# Patient Record
Sex: FEMALE | Race: WHITE | NOT HISPANIC OR LATINO | Employment: FULL TIME | URBAN - METROPOLITAN AREA
[De-identification: names, ages, dates, MRNs, and addresses within clinical notes are randomized per-mention and may not be internally consistent; named-entity substitution may affect disease eponyms.]

---

## 2019-04-16 ENCOUNTER — TRANSCRIBE ORDERS (OUTPATIENT)
Dept: ADMINISTRATIVE | Facility: HOSPITAL | Age: 59
End: 2019-04-16

## 2019-04-16 DIAGNOSIS — Z12.39 SCREENING BREAST EXAMINATION: Primary | ICD-10-CM

## 2019-05-09 ENCOUNTER — HOSPITAL ENCOUNTER (OUTPATIENT)
Dept: MAMMOGRAPHY | Facility: HOSPITAL | Age: 59
Discharge: HOME | End: 2019-05-09
Admitting: OBSTETRICS & GYNECOLOGY

## 2019-05-09 DIAGNOSIS — Z12.39 SCREENING BREAST EXAMINATION: ICD-10-CM

## 2019-05-09 PROCEDURE — 77067 SCR MAMMO BI INCL CAD: CPT

## 2019-05-09 PROCEDURE — 77063 BREAST TOMOSYNTHESIS BI: CPT

## 2019-05-13 ENCOUNTER — TELEPHONE (OUTPATIENT)
Dept: OBSTETRICS AND GYNECOLOGY | Age: 59
End: 2019-05-13

## 2019-05-13 DIAGNOSIS — R92.8 ABNORMAL MAMMOGRAM OF RIGHT BREAST: Primary | ICD-10-CM

## 2019-05-13 NOTE — TELEPHONE ENCOUNTER
Called to notify of need for breast ultrasound due to abnormality in the right breast, likely cyst but warrants further imaging. Order placed for breast US  I have not seen this pt but has an upcoming appt  Unable to reach, left VM for her to call back    Eneida Dubois MD  5/13/2019  1:38 PM

## 2019-05-21 ENCOUNTER — HOSPITAL ENCOUNTER (OUTPATIENT)
Dept: ULTRASOUND IMAGING | Facility: HOSPITAL | Age: 59
Discharge: HOME | End: 2019-05-21
Admitting: OBSTETRICS & GYNECOLOGY

## 2019-05-21 DIAGNOSIS — R92.8 ABNORMAL MAMMOGRAM OF RIGHT BREAST: ICD-10-CM

## 2019-05-21 PROCEDURE — 76641 ULTRASOUND BREAST COMPLETE: CPT

## 2019-05-21 PROCEDURE — 76642 ULTRASOUND BREAST LIMITED: CPT

## 2019-05-22 ENCOUNTER — TELEPHONE (OUTPATIENT)
Dept: OBSTETRICS AND GYNECOLOGY | Age: 59
End: 2019-05-22

## 2019-05-22 NOTE — TELEPHONE ENCOUNTER
----- Message from Eneida Dubois MD sent at 5/21/2019  5:28 PM EDT -----  Please confirm that the patient knows that the ultrasound showed an area that is stable since 2014 and appears to be benign. Recommend to repeat mammogram next year.    ----- Message -----  From: Interface, Rad Results Atempo In  Sent: 5/21/2019   5:14 PM  To: Eneida Dubois MD

## 2019-06-28 ENCOUNTER — OFFICE VISIT (OUTPATIENT)
Dept: OBSTETRICS AND GYNECOLOGY | Age: 59
End: 2019-06-28

## 2019-06-28 VITALS
BODY MASS INDEX: 29.33 KG/M2 | DIASTOLIC BLOOD PRESSURE: 70 MMHG | HEIGHT: 69 IN | WEIGHT: 198 LBS | SYSTOLIC BLOOD PRESSURE: 120 MMHG

## 2019-06-28 DIAGNOSIS — L98.9 SKIN LESION: ICD-10-CM

## 2019-06-28 DIAGNOSIS — Z00.00 HEALTH MAINTENANCE EXAMINATION: Primary | ICD-10-CM

## 2019-06-28 DIAGNOSIS — Z12.4 SCREENING FOR MALIGNANT NEOPLASM OF CERVIX: ICD-10-CM

## 2019-06-28 PROCEDURE — 11104 PUNCH BX SKIN SINGLE LESION: CPT | Performed by: OBSTETRICS & GYNECOLOGY

## 2019-06-28 PROCEDURE — 99386 PREV VISIT NEW AGE 40-64: CPT | Performed by: OBSTETRICS & GYNECOLOGY

## 2019-06-28 NOTE — PROGRESS NOTES
Skin lesion biopsy    She had a flat, stuck on pigmented lesion of the left inner thigh.  She had recently noticed it and was concerned.  Offered biopsy of it and she agreed.  I discussed risk of infection, bleeding, discomfort.  Informed consent was obtained    The skin was cleansed with as per alcohol. The subcutaneous tissue was injected with 3 mL's of 1% lidocaine with epinephrine.  A 5 mm punch biopsy was used and the underlying tissue was incised sharply.  5-0 Monocryl was used to reapproximate the skin. Triple antibiotic ointment was placed over top.    She tolerated the procedure well    Eneida Dubois MD  6/28/2019  3:55 PM

## 2019-06-28 NOTE — PROGRESS NOTES
Routine Annual Visit    2019    Patient: Cris Martin          MR#:[id number]      Chief Complaint   Patient presents with   • Gynecologic Exam     New pt. last pap 6 yrs ago per pt. last MG 2019       History of Present Illness    59 y.o. female  who presents for annual exam. It has been about six years since last exam, was  five years ago.  Menopause age 50 approx  No hot flashes  No urinary incontinence  No sexual activity, is divorved  Works for the postal service, delivers mail    Health Maintenance  Gardasil no  Last pap 6 yrs ago  Mammogram  This yr  Colonoscopy , no polyps, 10 yr  PCP no PCP    No LMP recorded. Patient is postmenopausal.  Obstetric History:  OB History      Para Term  AB Living    4 2 2   2 2    SAB TAB Ectopic Molar Multiple Live Births                        Menstrual History:     No LMP recorded. Patient is postmenopausal.       Sexual History:    not active    ________________________________________  There is no problem list on file for this patient.      Past Medical History:   Diagnosis Date   • Basal cell carcinoma        Family History   Problem Relation Age of Onset   • Breast cancer Maternal Grandmother    • Breast cancer Maternal Aunt    • Heart attack Maternal Aunt    • Heart attack Maternal Grandfather        Past Surgical History:   Procedure Laterality Date   • TUBAL ABDOMINAL LIGATION     • WISDOM TOOTH EXTRACTION     • WRIST SURGERY         Social History     Tobacco Use   Smoking Status Former Smoker   Smokeless Tobacco Never Used       currently has no medications in their medication list.  ________________________________________    Current contraception: post menopausal status  History of abnormal Pap smear: no  Family history of Breast, ovarian, uterine, colon, pancreatic cancer: yes - see above her pain is  History of abnormal mammogram: benign stable finding on most recent 10/18    The following portions of the patient's history  "were reviewed and updated as appropriate: allergies, current medications, past family history, past medical history, past social history, past surgical history and problem list.    Review of Systems a comprehensive review of systems is negative except as noted above    Objective   Physical Exam    /70   Ht 174 cm (68.5\")   Wt 89.8 kg (198 lb)   Breastfeeding? No   BMI 29.67 kg/m²    BP Readings from Last 3 Encounters:   06/28/19 120/70      Wt Readings from Last 3 Encounters:   06/28/19 89.8 kg (198 lb)         BMI: Body mass index is 29.67 kg/m².       General:   alert, appears stated age and cooperative   Heart:: regular rate and rhythm, S1, S2 normal, no murmur, click, rub or gallop   Lungs: normal respiratory effort and auscultation   Abdomen: soft, non-tender, without masses or organomegaly   Breast: inspection negative, no nipple discharge or bleeding, no masses or nodularity palpable   Urethra and bladder: urethral meatus normal; bladder nontender to palpation;   Vulva: normal, Bartholin's, Urethra, [place]'s normal   Vagina: normal mucosa, normal discharge   Cervix: multiparous appearance and no lesions   Uterus: normal size or anteverted   Adnexa: normal adnexa and no mass, fullness, tenderness   thigh Left thigh with pigment, stuck on lesions likely seborrheic keratosis but biopsy performed       Assessment:    abnormal annual exam w skin lesions    Plan:    Plan     []  Mammogram request made- already done within yr  [x]  PAP done  [x]  Labs: health maintenance labs, has no pcp, encouraged her to get one  []  GC/Chl/TV  []  DEXA scan   []  Referral for colonoscopy:     Problems Addressed this Visit     None      Visit Diagnoses     Health maintenance examination    -  Primary    Relevant Orders    TSH    Lipid Panel    Hemoglobin A1c    CBC & Differential    Basic Metabolic Panel    Skin lesion        Relevant Orders    Reference Histopathology    Screening for malignant neoplasm of cervix        " Relevant Orders    IGP, Apt HPV,rfx 16 / 18,45          Counseling  [x]  Nutrition  [x]  Physical activity/regular exercise   [x]  Healthy weight  []  Injury prevention  []  Smoking cessation  []  Substance misuse/abuse  [x]  Sexual behavior  []  STD prevention  []  Contraception  []  Dental health  []  Mental health  []  Immunization  [x]  Encouraged QUYNH Dubois MD  06/28/2019  11:36 AM

## 2019-07-02 LAB
BASOPHILS # BLD AUTO: 0.03 10*3/MM3 (ref 0–0.2)
BASOPHILS NFR BLD AUTO: 0.6 % (ref 0–1.5)
BUN SERPL-MCNC: 13 MG/DL (ref 8–23)
BUN/CREAT SERPL: 16.9 (ref 7–25)
CALCIUM SERPL-MCNC: 9 MG/DL (ref 8.6–10.5)
CHLORIDE SERPL-SCNC: 106 MMOL/L (ref 98–107)
CHOLEST SERPL-MCNC: 234 MG/DL (ref 0–200)
CO2 SERPL-SCNC: 28.3 MMOL/L (ref 22–29)
CREAT SERPL-MCNC: 0.77 MG/DL (ref 0.57–1)
CYTOLOGIST CVX/VAG CYTO: NORMAL
CYTOLOGY CVX/VAG DOC CYTO: NORMAL
CYTOLOGY CVX/VAG DOC THIN PREP: NORMAL
DX ICD CODE: NORMAL
EOSINOPHIL # BLD AUTO: 0.11 10*3/MM3 (ref 0–0.4)
EOSINOPHIL NFR BLD AUTO: 2.1 % (ref 0.3–6.2)
ERYTHROCYTE [DISTWIDTH] IN BLOOD BY AUTOMATED COUNT: 14 % (ref 12.3–15.4)
GLUCOSE SERPL-MCNC: 94 MG/DL (ref 65–99)
HBA1C MFR BLD: 5.9 % (ref 4.8–5.6)
HCT VFR BLD AUTO: 41.2 % (ref 34–46.6)
HDLC SERPL-MCNC: 97 MG/DL (ref 40–60)
HGB BLD-MCNC: 12.4 G/DL (ref 12–15.9)
HIV 1 & 2 AB SER-IMP: NORMAL
HPV I/H RISK 4 DNA CVX QL PROBE+SIG AMP: NEGATIVE
IMM GRANULOCYTES # BLD AUTO: 0.01 10*3/MM3 (ref 0–0.05)
IMM GRANULOCYTES NFR BLD AUTO: 0.2 % (ref 0–0.5)
LDLC SERPL CALC-MCNC: 121 MG/DL (ref 0–100)
LYMPHOCYTES # BLD AUTO: 1.78 10*3/MM3 (ref 0.7–3.1)
LYMPHOCYTES NFR BLD AUTO: 34.1 % (ref 19.6–45.3)
MCH RBC QN AUTO: 28.4 PG (ref 26.6–33)
MCHC RBC AUTO-ENTMCNC: 30.1 G/DL (ref 31.5–35.7)
MCV RBC AUTO: 94.5 FL (ref 79–97)
MONOCYTES # BLD AUTO: 0.4 10*3/MM3 (ref 0.1–0.9)
MONOCYTES NFR BLD AUTO: 7.7 % (ref 5–12)
NEUTROPHILS # BLD AUTO: 2.89 10*3/MM3 (ref 1.7–7)
NEUTROPHILS NFR BLD AUTO: 55.3 % (ref 42.7–76)
NRBC BLD AUTO-RTO: 0 /100 WBC (ref 0–0.2)
OTHER STN SPEC: NORMAL
PLATELET # BLD AUTO: 244 10*3/MM3 (ref 140–450)
POTASSIUM SERPL-SCNC: 4.7 MMOL/L (ref 3.5–5.2)
RBC # BLD AUTO: 4.36 10*6/MM3 (ref 3.77–5.28)
SODIUM SERPL-SCNC: 144 MMOL/L (ref 136–145)
STAT OF ADQ CVX/VAG CYTO-IMP: NORMAL
TRIGL SERPL-MCNC: 78 MG/DL (ref 0–150)
TSH SERPL DL<=0.005 MIU/L-ACNC: 2.44 MIU/ML (ref 0.27–4.2)
VLDLC SERPL CALC-MCNC: 15.6 MG/DL
WBC # BLD AUTO: 5.22 10*3/MM3 (ref 3.4–10.8)

## 2019-07-03 ENCOUNTER — TELEPHONE (OUTPATIENT)
Dept: OBSTETRICS AND GYNECOLOGY | Age: 59
End: 2019-07-03

## 2019-07-03 NOTE — TELEPHONE ENCOUNTER
Unable to reach, left  for her to call back  Called patient to let her know that her labs were a little abnormal, her hemoglobin A1c was a little elevated at 5.9, at increased risk for having diabetes but does not have diabetes at this time. Also her cholesterol is a little elevated.   Would recommend healthy diet, limited red meat and fats, include more whole grains in diet and decrease simple carbs/white carbs.  Does need to establish with a primary care doctor, if she does not have one in mind can refer her.  Her thyroid testing is normal and her blood count is normal.    Eneida Dubois MD  7/3/2019  1:20 PM

## 2019-07-03 NOTE — TELEPHONE ENCOUNTER
----- Message from Eneida Dubois MD sent at 7/2/2019  4:34 PM EDT -----  Please let her know that her Pap is normal    ----- Message -----  From: Interface, Reflab Results In  Sent: 7/2/2019   9:37 AM  To: Enedia Dubois MD

## 2019-07-07 LAB
DX ICD CODE: NORMAL
PATH REPORT.FINAL DX SPEC: NORMAL
PATH REPORT.GROSS SPEC: NORMAL
PATH REPORT.SITE OF ORIGIN SPEC: NORMAL
PATHOLOGIST NAME: NORMAL
PAYMENT PROCEDURE: NORMAL

## 2019-07-08 ENCOUNTER — TELEPHONE (OUTPATIENT)
Dept: OBSTETRICS AND GYNECOLOGY | Age: 59
End: 2019-07-08

## 2019-07-08 NOTE — TELEPHONE ENCOUNTER
----- Message from Eneida Dubois MD sent at 7/8/2019  8:19 AM EDT -----  Please let her know that the skin area we biopsied is benign- does not need to see derm or have it removed unless if it is causing her problems.    ----- Message -----  From: Ruddy, Reflab Results In  Sent: 7/2/2019   9:37 AM  To: Eneida Dubois MD

## 2020-08-11 ENCOUNTER — OFFICE VISIT (OUTPATIENT)
Dept: OBSTETRICS AND GYNECOLOGY | Age: 60
End: 2020-08-11

## 2020-08-11 VITALS
SYSTOLIC BLOOD PRESSURE: 128 MMHG | BODY MASS INDEX: 29.92 KG/M2 | WEIGHT: 202 LBS | HEIGHT: 69 IN | DIASTOLIC BLOOD PRESSURE: 84 MMHG

## 2020-08-11 DIAGNOSIS — L98.9 SKIN LESION OF LEFT LEG: ICD-10-CM

## 2020-08-11 DIAGNOSIS — Z85.828 HISTORY OF BASAL CELL CARCINOMA: ICD-10-CM

## 2020-08-11 DIAGNOSIS — Z00.00 HEALTH MAINTENANCE EXAMINATION: Primary | ICD-10-CM

## 2020-08-11 DIAGNOSIS — Z12.31 SCREENING MAMMOGRAM, ENCOUNTER FOR: ICD-10-CM

## 2020-08-11 PROCEDURE — 99396 PREV VISIT EST AGE 40-64: CPT | Performed by: OBSTETRICS & GYNECOLOGY

## 2020-08-11 PROCEDURE — 99213 OFFICE O/P EST LOW 20 MIN: CPT | Performed by: OBSTETRICS & GYNECOLOGY

## 2020-08-11 RX ORDER — CLOTRIMAZOLE 1 %
CREAM (GRAM) TOPICAL 2 TIMES DAILY
Qty: 28 G | Refills: 1 | Status: SHIPPED | OUTPATIENT
Start: 2020-08-11 | End: 2020-08-25

## 2020-08-11 NOTE — PROGRESS NOTES
Routine Annual Visit    2020    Patient: Cris Martin          MR#:[id number]      Chief Complaint   Patient presents with   • Gynecologic Exam     last pap 19(-) MG 19 colonoscopy  dexa not on file. no complaints today        History of Present Illness    60 y.o. female  who presents for annual exam. She is doing well, has no complaints. She desires for us to do her lab works, does not have PCP at the moment.  Menopausal, no HRT  She has a lesion on her left leg that has become more itchy lately. 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 Patient is postmenopausal.  Obstetric History:  OB History        4    Para   2    Term   2            AB   2    Living   2       SAB        TAB        Ectopic        Molar        Multiple        Live Births                   Menstrual History:     No LMP recorded. Patient is postmenopausal.       ________________________________________  There is no problem list on file for this patient.      Past Medical History:   Diagnosis Date   • Basal cell carcinoma        Family History   Problem Relation Age of Onset   • Breast cancer Maternal Grandmother    • Breast cancer Maternal Aunt    • Heart attack Maternal Grandfather    • Heart attack Maternal Aunt    • Diabetes Maternal Aunt    • Alzheimer's disease Father    • Ovarian cancer Neg Hx    • Uterine cancer Neg Hx    • Colon cancer Neg Hx        Past Surgical History:   Procedure Laterality Date   • TUBAL ABDOMINAL LIGATION     • WISDOM TOOTH EXTRACTION     • WRIST SURGERY         Social History  "    Tobacco Use   Smoking Status Former Smoker   • Last attempt to quit:    • Years since quittin.6   Smokeless Tobacco Never Used       has a current medication list which includes the following prescription(s): melaleuca.  ________________________________________      Review of Systems   Constitutional: Negative for fever and unexpected weight change.   Respiratory: Negative for shortness of breath.    Cardiovascular: Negative for chest pain.   Gastrointestinal: Negative for abdominal pain, constipation and diarrhea.   Genitourinary: Negative for frequency and urgency.   Skin:        Skin lesion of leg     Hematological: Negative for adenopathy.   Psychiatric/Behavioral: Negative for dysphoric mood.       Objective   Physical Exam    /84   Ht 174 cm (68.5\")   Wt 91.6 kg (202 lb)   Breastfeeding No   BMI 30.27 kg/m²    BP Readings from Last 3 Encounters:   20 128/84   19 120/70      Wt Readings from Last 3 Encounters:   20 91.6 kg (202 lb)   19 89.8 kg (198 lb)         BMI: Body mass index is 30.27 kg/m².       General:   alert, appears stated age and cooperative   derm Ulcerated lesion of left shin approx 2-3 cm in size with surrounding erythema   Neck: No thyromegaly or LAD, no carotid bruit noted   Heart:: regular rate and rhythm, S1, S2 normal, no murmur, click, rub or gallop   Lungs: normal respiratory effort and auscultation   Abdomen: soft, non-tender, without masses or organomegaly   Breast: inspection negative, no nipple discharge or bleeding, no masses or nodularity palpable   Urethra and bladder: urethral meatus normal; bladder nontender to palpation;   Vulva: normal, Bartholin's, Urethra, [place]'s normal   Vagina: normal discharge, atrophic   Cervix: multiparous appearance and no lesions   Uterus: normal size or anteverted   Adnexa: normal adnexa and no mass, fullness, tenderness       Assessment:    normal annual exam  Skin " lesion  hypercholesterolemia    Plan:    Plan     [x]  Mammogram request made  []  PAP done UTD  [x]  Labs:  Health maintenance labs  []  GC/Chl/TV  []  DEXA scan   []  Referral for colonoscopy:     Referral to derm for skin lesion of left leg    Referral made to family medicine due to abnormal labs last year and need for health maintenance with FM or IM    Counseling  [x]  Nutrition  [x]  Physical activity/regular exercise   [x]  Healthy weight  []  Injury prevention  []  Smoking cessation  []  Substance misuse/abuse  [x]  Sexual behavior  []  STD prevention  []  Contraception  []  Dental health  [x]  Mental health  []  Immunization  [x]  Encouraged SBE      Patient's BMI is Body mass index is 30.27 kg/m²., which is classified as obese. We discussed health consequences of obesity and recommended weight loss. I counseled regarding diet modifications and exercise in order to reach weight loss goal.     Eneida Dubois MD  08/11/2020  08:29

## 2020-08-12 LAB
BUN SERPL-MCNC: 13 MG/DL (ref 8–23)
BUN/CREAT SERPL: 18.1 (ref 7–25)
CALCIUM SERPL-MCNC: 9.1 MG/DL (ref 8.6–10.5)
CHLORIDE SERPL-SCNC: 100 MMOL/L (ref 98–107)
CHOLEST SERPL-MCNC: 238 MG/DL (ref 0–200)
CO2 SERPL-SCNC: 29.1 MMOL/L (ref 22–29)
CREAT SERPL-MCNC: 0.72 MG/DL (ref 0.57–1)
ERYTHROCYTE [DISTWIDTH] IN BLOOD BY AUTOMATED COUNT: 13.1 % (ref 12.3–15.4)
GLUCOSE SERPL-MCNC: 96 MG/DL (ref 65–99)
HBA1C MFR BLD: 5.6 % (ref 4.8–5.6)
HCT VFR BLD AUTO: 37.7 % (ref 34–46.6)
HDLC SERPL-MCNC: 86 MG/DL (ref 40–60)
HGB BLD-MCNC: 12.2 G/DL (ref 12–15.9)
LDLC SERPL CALC-MCNC: 126 MG/DL (ref 0–100)
MCH RBC QN AUTO: 29.1 PG (ref 26.6–33)
MCHC RBC AUTO-ENTMCNC: 32.4 G/DL (ref 31.5–35.7)
MCV RBC AUTO: 90 FL (ref 79–97)
PLATELET # BLD AUTO: 271 10*3/MM3 (ref 140–450)
POTASSIUM SERPL-SCNC: 3.7 MMOL/L (ref 3.5–5.2)
RBC # BLD AUTO: 4.19 10*6/MM3 (ref 3.77–5.28)
SODIUM SERPL-SCNC: 139 MMOL/L (ref 136–145)
TRIGL SERPL-MCNC: 129 MG/DL (ref 0–150)
TSH SERPL DL<=0.005 MIU/L-ACNC: 5.19 UIU/ML (ref 0.27–4.2)
VLDLC SERPL CALC-MCNC: 25.8 MG/DL
WBC # BLD AUTO: 6.25 10*3/MM3 (ref 3.4–10.8)

## 2020-08-12 NOTE — PROGRESS NOTES
Attempted to call and notify that her cholesterol values are still little elevated, also her TSH is little elevated and she needs additional labs to further work-up possible hypothyroidism.    Unable to reach and left voicemail     Eneida Dubois MD  8/12/2020  16:50

## 2020-08-13 ENCOUNTER — TELEPHONE (OUTPATIENT)
Dept: OBSTETRICS AND GYNECOLOGY | Age: 60
End: 2020-08-13

## 2020-08-13 DIAGNOSIS — R79.89 ELEVATED TSH: Primary | ICD-10-CM

## 2020-08-13 NOTE — TELEPHONE ENCOUNTER
Pt was notified about thyroid and cholesterol. Are you going to place orders for further thyroid labs.    [phone]

## 2020-11-05 ENCOUNTER — HOSPITAL ENCOUNTER (OUTPATIENT)
Dept: MAMMOGRAPHY | Facility: HOSPITAL | Age: 60
Discharge: HOME | End: 2020-11-05
Admitting: OBSTETRICS & GYNECOLOGY

## 2020-11-05 PROCEDURE — 77067 SCR MAMMO BI INCL CAD: CPT

## 2020-11-05 PROCEDURE — 77063 BREAST TOMOSYNTHESIS BI: CPT

## 2021-09-14 ENCOUNTER — OFFICE VISIT (OUTPATIENT)
Dept: OBSTETRICS AND GYNECOLOGY | Age: 61
End: 2021-09-14

## 2021-09-14 VITALS
DIASTOLIC BLOOD PRESSURE: 74 MMHG | WEIGHT: 207.4 LBS | SYSTOLIC BLOOD PRESSURE: 126 MMHG | HEIGHT: 69 IN | BODY MASS INDEX: 30.72 KG/M2

## 2021-09-14 DIAGNOSIS — Z12.31 ENCOUNTER FOR SCREENING MAMMOGRAM FOR MALIGNANT NEOPLASM OF BREAST: ICD-10-CM

## 2021-09-14 DIAGNOSIS — Z20.822 EXPOSURE TO COVID-19 VIRUS: ICD-10-CM

## 2021-09-14 DIAGNOSIS — Z01.419 ENCOUNTER FOR GYNECOLOGICAL EXAMINATION WITHOUT ABNORMAL FINDING: Primary | ICD-10-CM

## 2021-09-14 DIAGNOSIS — Z00.00 HEALTH MAINTENANCE EXAMINATION: ICD-10-CM

## 2021-09-14 PROCEDURE — 99396 PREV VISIT EST AGE 40-64: CPT | Performed by: OBSTETRICS & GYNECOLOGY

## 2021-09-14 NOTE — PROGRESS NOTES
Routine Annual Visit    2021    Patient: Cris Martin          MR#:[id number]      Chief Complaint   Patient presents with   • Gynecologic Exam     AE Today, Last AE 2020, Last pap 2019 (-), HPV (-), MG 2020, Colonoscopy about due. Pt has no complaints.        History of Present Illness    61 y.o. female  who presents for annual exam.  She has been caring for her mother who lives in [place], she is here for 2 weeks and then visits her mother for 3 weeks.  She feels as though she may have had Covid, but she has not gotten the vaccination.  She is adamant that she will not be vaccinated.  She requests to check antibodies.  She does not currently have a PCP, recommended getting 1.  Some of her screening labs were abnormal last year but she did not have follow-up testing.    Health Maintenance  Last pap: , history abnormal: none  Mammogram: 2020  Colonoscopy , repeat due 10 yr  Family history of Breast, ovarian, uterine, colon, pancreatic cancer: yes - mat aunt    No LMP recorded. Patient is postmenopausal.  Obstetric History:  OB History        4    Para   2    Term   2            AB   2    Living   2       SAB        TAB        Ectopic        Molar        Multiple        Live Births                   Menstrual History:     No LMP recorded. Patient is postmenopausal.       ________________________________________  Patient Active Problem List   Diagnosis   • Skin lesion of left leg       Past Medical History:   Diagnosis Date   • Basal cell carcinoma        Family History   Problem Relation Age of Onset   • Breast cancer Maternal Grandmother    • Breast cancer Maternal Aunt    • Heart attack Maternal Grandfather    • Heart attack Maternal Aunt    • Diabetes Maternal Aunt    • Alzheimer's disease Father    • Ovarian cancer Neg Hx    • Uterine cancer Neg Hx    • Colon cancer Neg Hx        Past Surgical History:   Procedure Laterality Date   • TUBAL ABDOMINAL LIGATION     •  "WISDOM TOOTH EXTRACTION     • WRIST SURGERY         Social History     Tobacco Use   Smoking Status Former Smoker   • Quit date:    • Years since quittin.7   Smokeless Tobacco Never Used       has a current medication list which includes the following prescription(s): melaleuca.  ________________________________________      Review of Systems   Constitutional: Negative for fever and unexpected weight change.   Respiratory: Negative for shortness of breath.    Cardiovascular: Negative for chest pain.   Gastrointestinal: Negative for abdominal pain, constipation and diarrhea.   Genitourinary: Negative for frequency and urgency.   Hematological: Negative for adenopathy.   Psychiatric/Behavioral: Negative for dysphoric mood.       Objective   Physical Exam    /74   Ht 174 cm (68.5\")   Wt 94.1 kg (207 lb 6.4 oz)   Breastfeeding No   BMI 31.08 kg/m²    BP Readings from Last 3 Encounters:   21 126/74   20 128/84   19 120/70      Wt Readings from Last 3 Encounters:   21 94.1 kg (207 lb 6.4 oz)   20 91.6 kg (202 lb)   19 89.8 kg (198 lb)         BMI: Body mass index is 31.08 kg/m².       General:   alert, appears stated age and cooperative   Neck: No thyromegaly or LAD, no carotid bruit noted   Heart:: regular rate and rhythm, S1, S2 normal, no murmur, click, rub or gallop   Lungs: normal respiratory effort and auscultation   Abdomen: soft, non-tender, without masses or organomegaly   Breast: inspection negative, no nipple discharge or bleeding, no masses or nodularity palpable   Urethra and bladder: urethral meatus normal; bladder nontender to palpation;   Vulva: normal, Bartholin's, Urethra, [place]'s normal   Vagina: atrophic but normal mucosa   Cervix: no lesions and nulliparous appearance   Uterus: normal size and anteverted   Adnexa: normal adnexa and no mass, fullness, tenderness       Assessment:    normal annual exam   Menopause  Health " maintenance    Plan:    Plan     [x]  Mammogram request made  []  PAP done  []  Labs:   []  GC/Chl/TV  []  DEXA scan   []  Referral for colonoscopy:     Recommend she get PCP, had elevated lipids last yr and recommended diet/exercise and repeating. Also had TSH slightly elevated and did not get follow up labs. Repeated today    Counseling  [x]  Nutrition  [x]  Physical activity/regular exercise   [x]  Healthy weight  []  Injury prevention  []  Smoking cessation  []  Substance misuse/abuse  []  Sexual behavior  []  STD prevention  []  Contraception  []  Dental health  []  Mental health  [x]  Immunization declines COVID vaccine  [x]  Encouraged SBE      Patient's (Body mass index is 31.08 kg/m².) indicates that they are obese (BMI >30) with health related conditions that include none . Weight is worsening. BMI is is above average; BMI management plan is completed. We discussed portion control and increasing exercise.       Eneida Dubois MD  09/14/2021  14:09 EDT

## 2021-09-15 LAB
BUN SERPL-MCNC: 12 MG/DL (ref 8–27)
BUN/CREAT SERPL: 15 (ref 12–28)
CALCIUM SERPL-MCNC: 9.6 MG/DL (ref 8.7–10.3)
CHLORIDE SERPL-SCNC: 101 MMOL/L (ref 96–106)
CHOLEST SERPL-MCNC: 251 MG/DL (ref 100–199)
CO2 SERPL-SCNC: 25 MMOL/L (ref 20–29)
CREAT SERPL-MCNC: 0.78 MG/DL (ref 0.57–1)
ERYTHROCYTE [DISTWIDTH] IN BLOOD BY AUTOMATED COUNT: 13.2 % (ref 11.7–15.4)
GLUCOSE SERPL-MCNC: 86 MG/DL (ref 65–99)
HCT VFR BLD AUTO: 38.4 % (ref 34–46.6)
HDLC SERPL-MCNC: 91 MG/DL
HGB BLD-MCNC: 12.4 G/DL (ref 11.1–15.9)
LDLC SERPL CALC-MCNC: 133 MG/DL (ref 0–99)
MCH RBC QN AUTO: 28.7 PG (ref 26.6–33)
MCHC RBC AUTO-ENTMCNC: 32.3 G/DL (ref 31.5–35.7)
MCV RBC AUTO: 89 FL (ref 79–97)
PLATELET # BLD AUTO: 272 X10E3/UL (ref 150–450)
POTASSIUM SERPL-SCNC: 4.3 MMOL/L (ref 3.5–5.2)
RBC # BLD AUTO: 4.32 X10E6/UL (ref 3.77–5.28)
SARS-COV-2 AB SERPL IA-ACNC: 496 U/ML
SARS-COV-2 AB SERPL-IMP: POSITIVE
SODIUM SERPL-SCNC: 141 MMOL/L (ref 134–144)
T4 FREE SERPL-MCNC: 1.08 NG/DL (ref 0.82–1.77)
TRIGL SERPL-MCNC: 159 MG/DL (ref 0–149)
TSH SERPL DL<=0.005 MIU/L-ACNC: 3.69 UIU/ML (ref 0.45–4.5)
VLDLC SERPL CALC-MCNC: 27 MG/DL (ref 5–40)
WBC # BLD AUTO: 7 X10E3/UL (ref 3.4–10.8)

## 2021-09-15 NOTE — PROGRESS NOTES
Please let her know that she does have Covid antibodies, so seems to have likely had it in the past.  Her thyroid lab is normal this year.  She has no anemia, her blood count and metabolic panel is normal.  Her lipids are a bit elevated, they were not collected as fasting, please see when she had last eaten when she came to her appointment.  Would recommend seeing a primary care doctor, modifying her diet to include lower fat and more vegetables, increasing exercise and weight loss will help with her lipids as well.

## 2021-11-16 ENCOUNTER — APPOINTMENT (OUTPATIENT)
Dept: MAMMOGRAPHY | Facility: HOSPITAL | Age: 61
End: 2021-11-16

## 2021-11-24 ENCOUNTER — HOSPITAL ENCOUNTER (OUTPATIENT)
Dept: MAMMOGRAPHY | Facility: HOSPITAL | Age: 61
Discharge: HOME | End: 2021-11-24
Admitting: OBSTETRICS & GYNECOLOGY

## 2021-11-24 DIAGNOSIS — Z12.31 ENCOUNTER FOR SCREENING MAMMOGRAM FOR MALIGNANT NEOPLASM OF BREAST: ICD-10-CM

## 2021-11-24 PROCEDURE — 77067 SCR MAMMO BI INCL CAD: CPT

## 2021-11-24 PROCEDURE — 77063 BREAST TOMOSYNTHESIS BI: CPT

## 2021-11-29 DIAGNOSIS — R92.2 INCONCLUSIVE MAMMOGRAM: Primary | ICD-10-CM

## 2021-11-29 NOTE — PROGRESS NOTES
Attempted to call patient, unable to reach and left voicemail to call back.  She needs additional views of the left breast and these imaging studies were ordered.    Eneida Dubois MD  11/29/2021  09:51 EST

## 2021-12-17 ENCOUNTER — APPOINTMENT (OUTPATIENT)
Dept: ULTRASOUND IMAGING | Facility: HOSPITAL | Age: 61
End: 2021-12-17

## 2021-12-17 ENCOUNTER — APPOINTMENT (OUTPATIENT)
Dept: MAMMOGRAPHY | Facility: HOSPITAL | Age: 61
End: 2021-12-17

## 2021-12-27 ENCOUNTER — HOSPITAL ENCOUNTER (OUTPATIENT)
Dept: MAMMOGRAPHY | Facility: HOSPITAL | Age: 61
Discharge: HOME | End: 2021-12-27

## 2021-12-27 ENCOUNTER — HOSPITAL ENCOUNTER (OUTPATIENT)
Dept: ULTRASOUND IMAGING | Facility: HOSPITAL | Age: 61
Discharge: HOME | End: 2021-12-27

## 2021-12-27 DIAGNOSIS — R92.2 INCONCLUSIVE MAMMOGRAM: ICD-10-CM

## 2021-12-27 PROCEDURE — 77065 DX MAMMO INCL CAD UNI: CPT

## 2021-12-27 PROCEDURE — 76642 ULTRASOUND BREAST LIMITED: CPT

## 2021-12-27 PROCEDURE — G0279 TOMOSYNTHESIS, MAMMO: HCPCS

## 2021-12-29 DIAGNOSIS — N63.0 BREAST MASS IN FEMALE: Primary | ICD-10-CM

## 2021-12-30 ENCOUNTER — TELEPHONE (OUTPATIENT)
Dept: OBSTETRICS AND GYNECOLOGY | Age: 61
End: 2021-12-30

## 2021-12-30 NOTE — TELEPHONE ENCOUNTER
Talked with patient, reviewed results and I agree with biopsy, she had a question of proceeding with lumpectomy instead.  Would be best to first know what it is first with the biopsy.  She does have it scheduled    Eneida Dubois MD  12/30/2021  14:19 EST

## 2021-12-30 NOTE — TELEPHONE ENCOUNTER
Patient called and is asking for you to call her back about her mammogram results and the Breast Biopsy that is scheduled for 1/13/22. Her number is [phone]. I advised pt you would not be back into the office until Tuesday 1/4/22. Pt voiced understanding.

## 2022-01-13 ENCOUNTER — HOSPITAL ENCOUNTER (OUTPATIENT)
Dept: MAMMOGRAPHY | Facility: HOSPITAL | Age: 62
Discharge: HOME | End: 2022-01-13

## 2022-01-13 ENCOUNTER — HOSPITAL ENCOUNTER (OUTPATIENT)
Dept: ULTRASOUND IMAGING | Facility: HOSPITAL | Age: 62
Discharge: HOME | End: 2022-01-13

## 2022-01-13 VITALS
DIASTOLIC BLOOD PRESSURE: 83 MMHG | BODY MASS INDEX: 30.07 KG/M2 | SYSTOLIC BLOOD PRESSURE: 153 MMHG | RESPIRATION RATE: 16 BRPM | OXYGEN SATURATION: 100 % | HEART RATE: 65 BPM | HEIGHT: 69 IN | WEIGHT: 203 LBS | TEMPERATURE: 98.4 F

## 2022-01-13 DIAGNOSIS — N63.0 BREAST MASS IN FEMALE: ICD-10-CM

## 2022-01-13 PROCEDURE — 88360 TUMOR IMMUNOHISTOCHEM/MANUAL: CPT | Performed by: NURSE PRACTITIONER

## 2022-01-13 PROCEDURE — 77065 DX MAMMO INCL CAD UNI: CPT

## 2022-01-13 PROCEDURE — A4648 IMPLANTABLE TISSUE MARKER: HCPCS

## 2022-01-13 PROCEDURE — 88305 TISSUE EXAM BY PATHOLOGIST: CPT | Performed by: NURSE PRACTITIONER

## 2022-01-13 PROCEDURE — 0 LIDOCAINE 1 % SOLUTION: Performed by: RADIOLOGY

## 2022-01-13 PROCEDURE — 88342 IMHCHEM/IMCYTCHM 1ST ANTB: CPT | Performed by: NURSE PRACTITIONER

## 2022-01-13 PROCEDURE — 88341 IMHCHEM/IMCYTCHM EA ADD ANTB: CPT | Performed by: NURSE PRACTITIONER

## 2022-01-13 RX ORDER — LIDOCAINE HYDROCHLORIDE 10 MG/ML
10 INJECTION, SOLUTION INFILTRATION; PERINEURAL ONCE
Status: COMPLETED | OUTPATIENT
Start: 2022-01-13 | End: 2022-01-13

## 2022-01-13 RX ORDER — LIDOCAINE HYDROCHLORIDE AND EPINEPHRINE 10; 10 MG/ML; UG/ML
10 INJECTION, SOLUTION INFILTRATION; PERINEURAL ONCE
Status: COMPLETED | OUTPATIENT
Start: 2022-01-13 | End: 2022-01-13

## 2022-01-13 RX ADMIN — LIDOCAINE HYDROCHLORIDE 10 ML: 10 INJECTION, SOLUTION INFILTRATION; PERINEURAL at 11:38

## 2022-01-13 RX ADMIN — LIDOCAINE HYDROCHLORIDE,EPINEPHRINE BITARTRATE 10 ML: 10; .01 INJECTION, SOLUTION INFILTRATION; PERINEURAL at 11:38

## 2022-01-13 NOTE — NURSING NOTE
Biopsy site to left outer mid breast clear with Exofin dry and intact. No firmness or swelling noted at or around biopsy site. Denies pain. Ice pack with protective covering applied to biopsy site. Discharge instructions discussed with understanding voiced by patient. Copies provided to patient. No distress noted. To home via private vehicle.

## 2022-01-13 NOTE — H&P
Name: Cris Martin ADMIT: 2022   : 1960  PCP: Provider, No Known    MRN: [id number] LOS: 0 days   AGE/SEX: 61 y.o. female  ROOM: Room/bed info not found       Chief complaint L breast mass    Present Illness or Internal History:  Patient is a 61 y.o. female presents with L breast mass for uS bx.     Past Surgical History:  Past Surgical History:   Procedure Laterality Date   • ORIF FINGER / THUMB FRACTURE Left    • TUBAL ABDOMINAL LIGATION     • WISDOM TOOTH EXTRACTION     • WRIST SURGERY         Past Medical History:  Past Medical History:   Diagnosis Date   • Basal cell carcinoma     Right breast, ankle, both shoulders, back       Home Medications:  (Not in a hospital admission)      Allergies:  Penicillins    Family History:  Family History   Problem Relation Age of Onset   • Breast cancer Maternal Grandmother    • Breast cancer Maternal Aunt    • Heart attack Maternal Grandfather    • Heart attack Maternal Aunt    • Diabetes Maternal Aunt    • Alzheimer's disease Father    • Ovarian cancer Neg Hx    • Uterine cancer Neg Hx    • Colon cancer Neg Hx        Social History:  Social History     Tobacco Use   • Smoking status: Former Smoker     Quit date:      Years since quittin.0   • Smokeless tobacco: Never Used   Substance Use Topics   • Alcohol use: No   • Drug use: No        Objective     Physical Exam:    No exam performed today,    Vital Signs  Temp:  [98.4 °F (36.9 °C)] 98.4 °F (36.9 °C)  Heart Rate:  [68] 68  Resp:  [18] 18  BP: (138)/(80) 138/80    Anticipated Surgical Procedure:  Left breast ultrasound guided core needle biopsy    The risks, benefits and alternatives of this procedure have been discussed with the patient or responsible party: Yes        Nikia Saini MD  22  10:53 EST

## 2022-01-14 LAB
LAB AP CASE REPORT: NORMAL
LAB AP CLINICAL INFORMATION: NORMAL
LAB AP SPECIAL STAINS: NORMAL
LAB AP SYNOPTIC CHECKLIST: NORMAL
PATH REPORT.FINAL DX SPEC: NORMAL
PATH REPORT.GROSS SPEC: NORMAL

## 2022-01-17 ENCOUNTER — TELEPHONE (OUTPATIENT)
Dept: SURGERY | Facility: CLINIC | Age: 62
End: 2022-01-17

## 2022-01-17 ENCOUNTER — TELEPHONE (OUTPATIENT)
Dept: OBSTETRICS AND GYNECOLOGY | Age: 62
End: 2022-01-17

## 2022-01-17 DIAGNOSIS — C50.912 MALIGNANT NEOPLASM OF LEFT FEMALE BREAST, UNSPECIFIED ESTROGEN RECEPTOR STATUS, UNSPECIFIED SITE OF BREAST: Primary | ICD-10-CM

## 2022-01-17 NOTE — TELEPHONE ENCOUNTER
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  The area is small on imaging but complete staging will be performed by the breast surgeon, likely after surgery.    Eneida Dubois MD  1/17/2022  10:34 EST

## 2022-01-17 NOTE — TELEPHONE ENCOUNTER
New patient appointment with Dr. Rich is scheduled on 2/2/2022 @ 9:00am.    Called and left message with patient about appointment time, patient to call back and confirm.    Sent patient a reminder letter in the mail.

## 2022-01-18 ENCOUNTER — TELEPHONE (OUTPATIENT)
Dept: SURGERY | Facility: CLINIC | Age: 62
End: 2022-01-18

## 2022-01-18 NOTE — TELEPHONE ENCOUNTER
Breast MRI is scheduled on 1/31/2022 @ 12:45pm, patient arrival 12:15pm.    Called and spoke to patient, patient expressed v/u of appointment time.

## 2022-01-24 ENCOUNTER — APPOINTMENT (OUTPATIENT)
Dept: ULTRASOUND IMAGING | Facility: HOSPITAL | Age: 62
End: 2022-01-24

## 2022-01-25 ENCOUNTER — APPOINTMENT (OUTPATIENT)
Dept: ULTRASOUND IMAGING | Facility: HOSPITAL | Age: 62
End: 2022-01-25

## 2022-01-31 ENCOUNTER — HOSPITAL ENCOUNTER (OUTPATIENT)
Dept: MRI IMAGING | Facility: HOSPITAL | Age: 62
Discharge: HOME | End: 2022-01-31
Admitting: SURGERY

## 2022-01-31 DIAGNOSIS — C50.812 MALIGNANT NEOPLASM OF OVERLAPPING SITES OF LEFT BREAST IN FEMALE, ESTROGEN RECEPTOR POSITIVE: ICD-10-CM

## 2022-01-31 DIAGNOSIS — Z17.0 MALIGNANT NEOPLASM OF OVERLAPPING SITES OF LEFT BREAST IN FEMALE, ESTROGEN RECEPTOR POSITIVE: ICD-10-CM

## 2022-01-31 LAB — CREAT BLDA-MCNC: 0.8 MG/DL (ref 0.6–1.3)

## 2022-01-31 PROCEDURE — 82565 ASSAY OF CREATININE: CPT

## 2022-01-31 PROCEDURE — A9577 INJ MULTIHANCE: HCPCS | Performed by: SURGERY

## 2022-01-31 PROCEDURE — 77049 MRI BREAST C-+ W/CAD BI: CPT

## 2022-01-31 PROCEDURE — 0 GADOBENATE DIMEGLUMINE 529 MG/ML SOLUTION: Performed by: SURGERY

## 2022-01-31 RX ADMIN — GADOBENATE DIMEGLUMINE 20 ML: 529 INJECTION, SOLUTION INTRAVENOUS at 13:20

## 2022-02-02 ENCOUNTER — TELEPHONE (OUTPATIENT)
Dept: SURGERY | Facility: CLINIC | Age: 62
End: 2022-02-02

## 2022-02-02 ENCOUNTER — PREP FOR SURGERY (OUTPATIENT)
Dept: OTHER | Facility: HOSPITAL | Age: 62
End: 2022-02-02

## 2022-02-02 ENCOUNTER — OFFICE VISIT (OUTPATIENT)
Dept: SURGERY | Facility: CLINIC | Age: 62
End: 2022-02-02

## 2022-02-02 ENCOUNTER — TRANSCRIBE ORDERS (OUTPATIENT)
Dept: SURGERY | Facility: CLINIC | Age: 62
End: 2022-02-02

## 2022-02-02 VITALS
DIASTOLIC BLOOD PRESSURE: 88 MMHG | BODY MASS INDEX: 30.96 KG/M2 | HEIGHT: 69 IN | SYSTOLIC BLOOD PRESSURE: 132 MMHG | RESPIRATION RATE: 18 BRPM | OXYGEN SATURATION: 99 % | WEIGHT: 209 LBS | HEART RATE: 86 BPM

## 2022-02-02 DIAGNOSIS — C50.812 MALIGNANT NEOPLASM OF OVERLAPPING SITES OF LEFT BREAST IN FEMALE, ESTROGEN RECEPTOR POSITIVE: Primary | ICD-10-CM

## 2022-02-02 DIAGNOSIS — Z17.0 MALIGNANT NEOPLASM OF OVERLAPPING SITES OF LEFT BREAST IN FEMALE, ESTROGEN RECEPTOR POSITIVE: Primary | ICD-10-CM

## 2022-02-02 DIAGNOSIS — Z80.3 FAMILY HISTORY OF BREAST CANCER: ICD-10-CM

## 2022-02-02 PROCEDURE — 99205 OFFICE O/P NEW HI 60 MIN: CPT | Performed by: SURGERY

## 2022-02-02 RX ORDER — DIAZEPAM 5 MG/1
10 TABLET ORAL ONCE
Status: CANCELLED | OUTPATIENT
Start: 2022-03-01 | End: 2022-02-02

## 2022-02-02 RX ORDER — CLINDAMYCIN PHOSPHATE 900 MG/50ML
900 INJECTION INTRAVENOUS ONCE
Status: CANCELLED | OUTPATIENT
Start: 2022-03-01 | End: 2022-02-02

## 2022-02-02 RX ORDER — HEPARIN SODIUM 5000 [USP'U]/ML
5000 INJECTION, SOLUTION INTRAVENOUS; SUBCUTANEOUS
Status: CANCELLED | OUTPATIENT
Start: 2022-03-01 | End: 2022-03-02

## 2022-02-02 NOTE — PROGRESS NOTES
[facility]     Referring Provider: Eneida Dubois MD     Chief complaint: Newly diagnosed breast cancer     HPI: Ms. Cris Martin is a 60 yo woman, seen at the request of Dr. Eneida Dubois, for a new diagnosis of left breast cancer. This was initially detected as an imaging abnormality on routine screening. Her work-up is detailed in the oncologic history below. Prior to the biopsy, she denies any breast lumps, pain, skin changes, or nipple discharge. She denies any prior history of abnormal mammograms or breast biopsies. She has a family history of breast cancer in her maternal grandmother (diagnosed at age 90) and a maternal great aunt (unknown age of diagnosis). She denies any family history of ovarian cancer.        Oncology/Hematology History Overview Note   11/05/2020, Screening MMG with [facility] ( [place]):  There are scattered areas of fibroglandular density. A benign-appearing mass in the slightly upper central posterior right breast is not significantly changed. There are no suspicious masses, calcifications, or areas of architectural distortion.  BI-RADS 2: Benign.     Malignant neoplasm of overlapping sites of left breast in female, estrogen receptor positive (HCC)   11/23/2021 Initial Diagnosis    Malignant neoplasm of overlapping sites of left breast in female, estrogen receptor positive (HCC)     11/24/2021 Imaging    Screening MMG with [facility] ( [place]):  There are scattered areas of fibroglandular density. There is a 0.9 cm mass with corresponding architectural distortion in the upper central posterior left breast. The distortion is best appreciated on Tomosynthesis. There are no suspicious masses, calcifications, or areas of architectural distortion in the right breast.  BI-RADS 0: Incomplete.     12/27/2021 Imaging    Left Diagnostic MMG with [facility] & Left Breast US ( [place]):  MMG:  With additional imaging there is persistence of the area of focal asymmetry in the middle-third of the left  breast at the 12 o'clock position.  US:  At the 12 o'clock position on the order of 5 cm from the nipple there is a 0.8 cm irregular hypoechoic mass with mild posterior acoustic shadowing.  BI-RADS 4: Suspicious.     1/13/2022 Biopsy    Left Breast, US-Guided Biopsy ([facility] [place]):    1. Breast, Left, 12 O'clock, 5 cm from Nipple, Biopsy: Invasive ductal adenocarcinoma and ductal carcinoma in situ.               A. Invasive ductal adenocarcinoma:          1. The invasive ductal adenocarcinoma is Rowe grade I (tubular grade 2, nuclear grade 2, mitotic grade 1).                            2. The invasive ductal adenocarcinoma measures up to 5.8 mm on the slide.                            3. No capillary lymphatic invasion is identified.                                              4. No perineural invasion is seen.                            5. No definitive microcalcifications are present.               B. Ductal carcinoma in situ:                            1. The ductal carcinoma in situ is of intermediate nuclear grade with cribriform architecture and focal necrosis.    ER+ (99%, strong)  AZ+ (99%, strong)  HER2 negative (IHC 1+)  Ki-67 10%    -Post-procedural digital mammographic views of the left breast demonstrate the bowtie clip in the upper central posterior left breast approximately 0.9 cm from the residual focal asymmetry.      1/31/2022 Imaging    Bilateral Breast MRI ([facility]):  In the left breast centered at the 12 o'clock position on the order of 9 cm from the nipple on this examination, there is a focal signal void that is at the posterior margin of an enhancing lesion that measures 0.6 cm in the anterior-posterior dimension, 0.4 cm in the superior-inferior dimension and 0.6 cm in the medial-lateral dimension. This represents the biopsy-proven site of malignancy. No other areas of abnormal enhancement or morphology are seen in the left breast. I see no evidence  for abnormal left breast skin, nipple or  chest wall enhancement and there is no evidence for left axillary or internal mammary chain adenopathy.  In the right breast in the middle third retroareolar region centered on the order of 7 cm from the nipple, there is an oval circumscribed T2 hyperintense enhancing mass that measures on the order of 1.6 cm in greatest dimension. The mass has been stable when compared to multiple  remote prior mammograms dating back to 2014. This is consistent with a benign fibroadenoma. No areas of abnormal enhancement or otherwise abnormal morphology seen in the right breast. I see no evidence for abnormal skin, nipple or chest wall enhancement of the right breast and there is no evidence for right axillary or internal mammary chain adenopathy.  BI-RADS 6: Known malignancy.         Review of Systems   Constitutional: Negative for appetite change, chills, diaphoresis, fatigue, fever and unexpected weight change.   HENT:   Negative for hearing loss, lump/mass, mouth sores, nosebleeds, sore throat, tinnitus, trouble swallowing and voice change.    Eyes: Negative for eye problems and icterus.   Respiratory: Negative for chest tightness, cough, hemoptysis, shortness of breath and wheezing.    Cardiovascular: Negative for chest pain, leg swelling and palpitations.   Gastrointestinal: Negative for abdominal distention, abdominal pain, blood in stool, constipation, diarrhea, nausea, rectal pain and vomiting.   Endocrine: Negative for hot flashes.   Genitourinary: Negative for bladder incontinence, difficulty urinating, dyspareunia, dysuria, frequency, hematuria, menstrual problem, nocturia, pelvic pain, vaginal bleeding and vaginal discharge.    Musculoskeletal: Negative for arthralgias, back pain, flank pain, gait problem, myalgias, neck pain and neck stiffness.   Skin: Negative for itching, rash and wound.   Neurological: Negative for dizziness, extremity weakness, gait problem, headaches, light-headedness, numbness, seizures and  speech difficulty.   Hematological: Negative for adenopathy. Does not bruise/bleed easily.   Psychiatric/Behavioral: Negative for confusion, decreased concentration, depression, sleep disturbance and suicidal ideas. The patient is not nervous/anxious.    I personally reviewed and updated the ROS.      Medications:    Current Outpatient Medications:   •  MELALEUCA SC, Take  by mouth., Disp: , Rfl:       Allergies   Allergen Reactions   • Penicillins Hives       Past Medical History:   Diagnosis Date   • Basal cell carcinoma     Right breast, ankle, both shoulders, back   • Malignant neoplasm of overlapping sites of left breast in female, estrogen receptor positive (HCC) 2022       Past Surgical History:   Procedure Laterality Date   • ORIF FINGER / THUMB FRACTURE Left    • TUBAL ABDOMINAL LIGATION     • WISDOM TOOTH EXTRACTION     • WRIST SURGERY         Family History   Problem Relation Age of Onset   • Breast cancer Maternal Grandmother 90   • Heart attack Maternal Grandfather    • Heart attack Maternal Aunt    • Diabetes Maternal Aunt    • Alzheimer's disease Father    • Breast cancer Other         Maternal great aunt   • Ovarian cancer Neg Hx    • Uterine cancer Neg Hx    • Colon cancer Neg Hx        Social History     Socioeconomic History   • Marital status: Single   • Number of children: 2   Tobacco Use   • Smoking status: Former Smoker     Quit date:      Years since quittin.0   • Smokeless tobacco: Never Used   Substance and Sexual Activity   • Alcohol use: No   • Drug use: No   • Sexual activity: Not Currently       GYNECOLOGIC HISTORY:   . P: 2. AB: 2.  Last menstrual period: Postmenopausal  Age at menarche: 10  Age at first childbirth: 20  Lactation/How long: n/a   Age at menopause: 48  Total years of oral contraceptive use: 5  Total years of hormone replacement therapy: 0      PHYSICAL EXAMINATION:   Vitals:    22 0910   BP: 132/88   Pulse: 86   Resp: 18   SpO2: 99%     ECOG 0 -  Asymptomatic  General: NAD, well appearing  Psych: a&o x 3, normal mood and affect  Eyes: EOMI, no scleral icterus  ENMT: neck supple without masses or thyromegaly, mucus membranes moist  Resp: normal effort, CTAB  CV: RRR, no murmurs, no edema  GI: soft, NT, ND  MSK: normal gait, normal ROM in bilateral shoulders  Lymph nodes: no cervical, supraclavicular or axillary lymphadenopathy  Breast: symmetric, moderate size, grade 2 ptosis  Right: No visible abnormalities on inspection while seated, with arms raised or hands on hips. No masses, skin changes, or nipple abnormalities.  Left: No visible abnormalities on inspection while seated, with arms raised or hands on hips. No masses, skin changes, or nipple abnormalities.      I have independently reviewed her imaging and here are my findings:   In the left upper breast, there initially was an 8 mm mass seen on ultrasound located about 3 cm deep to the skin. There is questionable clip migration on postbiopsy mammogram. On MRI this corresponds to a 6 mm mass with the clip seen at the posterior aspect.      Assessment:  61 y.o. F with a new diagnosis of left breast cancer: Low grade, invasive ductal carcinoma, ER/MO+, Her2 negative; clinical T1bN0, anatomic stage IA, prognostic stage IA.      Discussion:  I had an extensive discussion with the patient and her family about the nature of her breast cancer diagnosis. We reviewed the components of breast tissue including ducts and lobules. We reviewed her pathology report in detail. We reviewed breast cancer histology, including stage, grade, ER/MO receptors, HER2 receptors and how this applies to her diagnosis. We reviewed the basics of systemic and local/regional management of breast cancer.      We discussed that in her case, systemic treatment would involve endocrine therapy and possibly chemotherapy. She will be referred to medical oncology postoperatively to discuss this further. We reviewed potential surgical  treatments to include partial mastectomy, mastectomy, sentinel lymph node biopsy and axillary node dissection and discussed the rationale associated with each approach. Regarding radiation therapy, we discussed that radiation is indicated in all cases of breast conservation and in only limited circumstances following mastectomy. We discussed that the primary goal of adjuvant radiation is to decrease the likelihood of local recurrence.     In her case, she is a good candidate for lumpectomy and she would like to proceed with breast conservation. We discussed that lumpectomy would require preoperative wire-localization. We also discussed the risk of positive margins and that she must have negative margins for lumpectomy to be an appropriate oncologic procedure. I will make every effort to obtain negative margins at her initial operation, but there is a 10-15% chance that she will require a second operation for re-excision, or possibly a total mastectomy. We will not know the margin status until after her final pathology has returned.     We discussed that most breast cancer is not hereditary, however given her family history, this may play a role in her case. Genetic testing is warranted and was sent today. This could affect surgical decision making. Should the results show a pathologic mutation, I would recommend a mastectomy on the cancer side and a contralateral risk-reduction mastectomy. She understands that this would not be for the treatment of her left breast cancer and will not improve her prognosis long term. This would be to reduce her risk of a second, primary breast cancer. If she is negative for a mutation, then I would not recommend a bilateral mastectomy, since her risk of a second primary cancer would be relatively low and endocrine therapy will further reduce her risk.    We discussed axillary staging. I described the procedure for sentinel lymph node biopsy in detail, including the preoperative  injection of a radiotracer and intraoperative injection of lymphazurin blue dye. I explained that this is a mapping test and not a cancer test, that all of the lymph nodes containing these dyes will be removed for complete testing by pathology, and that the results could impact the decision for adjuvant treatment or additional surgery.    I described additional risks and potential complications associated with surgery, including, but not limited to, bleeding, infection, complications related to blue dye, lymphedema, deformity/poor cosmetic result, chronic pain, neurovascular injury, numbness, seroma, hematoma, deep venous thrombosis, skin flap necrosis, disease recurrence and the possibility of requiring additional surgery. We also discussed other treatment options including the option of not undergoing any surgical treatment and the risks associated with this including disease progression. She expressed an understanding of these factors and wished to proceed.    Plan:  A multidisciplinary plan has been formulated for the patient:      (1) Breast Surgical Oncology:  -F/u genetic testing. I will call her with results.  -Left needle-localized partial mastectomy and sentinel lymph node biopsy.    (2) Medical Oncology:  -Will refer postoperatively.    (3) Radiation Oncology:  -Will refer postoperatively.    Fabiola Rich MD    I spent 75 minutes caring for Cris on this date of service. This time includes time spent by me in the following activities: preparing for the visit, performing a medically appropriate examination and/or evaluation , counseling and educating the patient/family/caregiver, ordering medications, tests, or procedures, documenting information in the medical record and independently interpreting results and communicating that information with the patient/family/caregiver.      CC:  Eneida Dubois MD

## 2022-02-02 NOTE — TELEPHONE ENCOUNTER
I called 30 gram tube 2.5% EMLA Cream to the patients following pharmacy on 2/2/22.    Directions are to apply typically to the LEFT  areola of the nipple and the skin just beyond it on the morning of surgery.     [id number] - [facility] [address]  AT [facility] [phone] [facility] [phone] FX

## 2022-02-04 ENCOUNTER — PATIENT ROUNDING (BHMG ONLY) (OUTPATIENT)
Dept: SURGERY | Facility: CLINIC | Age: 62
End: 2022-02-04

## 2022-02-08 ENCOUNTER — TELEPHONE (OUTPATIENT)
Dept: SURGERY | Facility: CLINIC | Age: 62
End: 2022-02-08

## 2022-02-08 NOTE — TELEPHONE ENCOUNTER
Surgery is scheduled on 3/1/2022 @ 12:00pm, NL @ 10:30am, SI @ 11:00am, patient arrival 8:30am.    PAT is scheduled on 2/10/2022 @ 1:30pm.    Post-op appointment with Dr. Rich is scheduled on 3/16/2022 @ 8:00am.    Called and spoke to patient, patient expressed v/u of appointment times.    Sent patient a reminder letter in the mail.

## 2022-02-10 ENCOUNTER — PRE-ADMISSION TESTING (OUTPATIENT)
Dept: PREADMISSION TESTING | Facility: HOSPITAL | Age: 62
End: 2022-02-10

## 2022-02-10 ENCOUNTER — NURSE NAVIGATOR (OUTPATIENT)
Dept: OTHER | Facility: HOSPITAL | Age: 62
End: 2022-02-10

## 2022-02-10 VITALS
SYSTOLIC BLOOD PRESSURE: 155 MMHG | WEIGHT: 206 LBS | RESPIRATION RATE: 16 BRPM | OXYGEN SATURATION: 100 % | DIASTOLIC BLOOD PRESSURE: 84 MMHG | TEMPERATURE: 98.3 F | BODY MASS INDEX: 31.22 KG/M2 | HEIGHT: 68 IN | HEART RATE: 105 BPM

## 2022-02-10 DIAGNOSIS — Z17.0 MALIGNANT NEOPLASM OF OVERLAPPING SITES OF LEFT BREAST IN FEMALE, ESTROGEN RECEPTOR POSITIVE: ICD-10-CM

## 2022-02-10 DIAGNOSIS — C50.812 MALIGNANT NEOPLASM OF OVERLAPPING SITES OF LEFT BREAST IN FEMALE, ESTROGEN RECEPTOR POSITIVE: ICD-10-CM

## 2022-02-10 LAB
ANION GAP SERPL CALCULATED.3IONS-SCNC: 10 MMOL/L (ref 5–15)
BASOPHILS # BLD AUTO: 0.04 10*3/MM3 (ref 0–0.2)
BASOPHILS NFR BLD AUTO: 0.8 % (ref 0–1.5)
BUN SERPL-MCNC: 10 MG/DL (ref 8–23)
BUN/CREAT SERPL: 13 (ref 7–25)
CALCIUM SPEC-SCNC: 9.5 MG/DL (ref 8.6–10.5)
CHLORIDE SERPL-SCNC: 102 MMOL/L (ref 98–107)
CO2 SERPL-SCNC: 27 MMOL/L (ref 22–29)
CREAT SERPL-MCNC: 0.77 MG/DL (ref 0.57–1)
DEPRECATED RDW RBC AUTO: 41.6 FL (ref 37–54)
EOSINOPHIL # BLD AUTO: 0.03 10*3/MM3 (ref 0–0.4)
EOSINOPHIL NFR BLD AUTO: 0.6 % (ref 0.3–6.2)
ERYTHROCYTE [DISTWIDTH] IN BLOOD BY AUTOMATED COUNT: 13.1 % (ref 12.3–15.4)
GFR SERPL CREATININE-BSD FRML MDRD: 76 ML/MIN/1.73
GLUCOSE SERPL-MCNC: 106 MG/DL (ref 65–99)
HCT VFR BLD AUTO: 38.9 % (ref 34–46.6)
HGB BLD-MCNC: 12.6 G/DL (ref 12–15.9)
IMM GRANULOCYTES # BLD AUTO: 0.01 10*3/MM3 (ref 0–0.05)
IMM GRANULOCYTES NFR BLD AUTO: 0.2 % (ref 0–0.5)
LYMPHOCYTES # BLD AUTO: 1.35 10*3/MM3 (ref 0.7–3.1)
LYMPHOCYTES NFR BLD AUTO: 25.5 % (ref 19.6–45.3)
MCH RBC QN AUTO: 28.5 PG (ref 26.6–33)
MCHC RBC AUTO-ENTMCNC: 32.4 G/DL (ref 31.5–35.7)
MCV RBC AUTO: 88 FL (ref 79–97)
MONOCYTES # BLD AUTO: 0.51 10*3/MM3 (ref 0.1–0.9)
MONOCYTES NFR BLD AUTO: 9.6 % (ref 5–12)
NEUTROPHILS NFR BLD AUTO: 3.35 10*3/MM3 (ref 1.7–7)
NEUTROPHILS NFR BLD AUTO: 63.3 % (ref 42.7–76)
NRBC BLD AUTO-RTO: 0 /100 WBC (ref 0–0.2)
PLATELET # BLD AUTO: 221 10*3/MM3 (ref 140–450)
PMV BLD AUTO: 9.8 FL (ref 6–12)
POTASSIUM SERPL-SCNC: 4.3 MMOL/L (ref 3.5–5.2)
QT INTERVAL: 390 MS
RBC # BLD AUTO: 4.42 10*6/MM3 (ref 3.77–5.28)
SODIUM SERPL-SCNC: 139 MMOL/L (ref 136–145)
WBC NRBC COR # BLD: 5.29 10*3/MM3 (ref 3.4–10.8)

## 2022-02-10 PROCEDURE — 93010 ELECTROCARDIOGRAM REPORT: CPT | Performed by: INTERNAL MEDICINE

## 2022-02-10 PROCEDURE — 85025 COMPLETE CBC W/AUTO DIFF WBC: CPT

## 2022-02-10 PROCEDURE — 93005 ELECTROCARDIOGRAM TRACING: CPT

## 2022-02-10 PROCEDURE — 80048 BASIC METABOLIC PNL TOTAL CA: CPT

## 2022-02-10 PROCEDURE — 36415 COLL VENOUS BLD VENIPUNCTURE: CPT

## 2022-02-10 RX ORDER — UBIDECARENONE 50 MG
50 CAPSULE ORAL DAILY
COMMUNITY
End: 2022-09-20

## 2022-02-10 RX ORDER — PERPHENAZINE 8 MG
500 TABLET ORAL DAILY
COMMUNITY
End: 2022-09-20

## 2022-02-10 RX ORDER — CALCIUM CARBONATE/VITAMIN D3 500-10/5ML
30 LIQUID (ML) ORAL DAILY
COMMUNITY
End: 2022-09-20

## 2022-02-10 NOTE — PROGRESS NOTES
Met Ms. Martin after her pre admission testing today. I introduced myself and navigational services. She is scheduled for a lumpectomy on March 1st. She has a good understanding of her pathology and treatment options and is comfortable with her plan of care. She had some questions about hormone blocking medication and we discussed those.     She stated she has a good support system of family and friends that are helpful and encouraging. She stated her daughter was therapist and very helpful. We did discuss [facility] and she was interested in getting set up. Referral sent.       We discussed integrative therapies and other services at [facility]. I gave her a navigation folder with the following information:     [facility] Cancer Support Network, Sharing Our Stories Breast Cancer Support Group, Cancer and Restorative Exercise (CARE), [facility], Together for Breast Cancer Survival, Guide for the Newly Diagnosed, [facility], [facility], Massage Therapy, Reiki Therapy, [employer] [place], Cancer Nutrition, and Survivorship Clinic.     She verbalized appreciation for navigational services and she has my contact information and will call with any questions that arise.

## 2022-02-10 NOTE — DISCHARGE INSTRUCTIONS
Take the following medications the morning of surgery: NONE      If you are on prescription narcotic pain medication to control your pain you may also take that medication the morning of surgery.    General Instructions:  • Do not eat solid food after midnight the night before surgery.  • You may drink clear liquids day of surgery but must stop at least one hour before your hospital arrival time.  • It is beneficial for you to have a clear drink that contains carbohydrates the day of surgery.  We suggest a 12 to 20 ounce bottle of Gatorade or Powerade for non-diabetic patients or a 12 to 20 ounce bottle of G2 or Powerade Zero for diabetic patients.     Clear liquids are liquids you can see through.  Nothing red in color.     Plain water                               Sports drinks  Sodas                                   Gelatin (Jell-O)  Fruit juices without pulp such as white grape juice and apple juice  Popsicles that contain no fruit or yogurt  Tea or coffee (no cream or milk added)  Gatorade / Powerade  G2 / Powerade Zero    • Bring any papers given to you in the doctor’s office.  • Wear clean comfortable clothes.  • Do not wear contact lenses, false eyelashes or make-up.  Bring a case for your glasses.   • Remove all piercings.  Leave jewelry and any other valuables at home.  • The Pre-Admission Testing nurse will instruct you to bring medications if unable to obtain an accurate list in Pre-Admission Testing.            Preventing a Surgical Site Infection:  • For 2 to 3 days before surgery, avoid shaving with a razor because the razor can irritate skin and make it easier to develop an infection.    • Any areas of open skin can increase the risk of a post-operative wound infection by allowing bacteria to enter and travel throughout the body.  Notify your surgeon if you have any skin wounds / rashes even if it is not near the expected surgical site.  The area will need assessed to determine if surgery should be  delayed until it is healed.  • The night prior to surgery shower using a fresh bar of anti-bacterial soap (such as Dial) and clean washcloth.  Sleep in a clean bed with clean clothing.  Do not allow pets to sleep with you.  • Shower on the morning of surgery using a fresh bar of anti-bacterial soap (such as Dial) and clean washcloth.  Dry with a clean towel and dress in clean clothing.  • Ask your surgeon if you will be receiving antibiotics prior to surgery.  • Make sure you, your family, and all healthcare providers clean their hands with soap and water or an alcohol based hand  before caring for you or your wound.    Day of surgery:  Your arrival time is approximately two hours before your scheduled surgery time.  Upon arrival, a Pre-op nurse and Anesthesiologist will review your health history, obtain vital signs, and answer questions you may have.  The only belongings needed at this time will be a list of your home medications and if applicable your C-PAP/BI-PAP machine.  A Pre-op nurse will start an IV and you may receive medication in preparation for surgery, including something to help you relax.     Please be aware that surgery does come with discomfort.  We want to make every effort to control your discomfort so please discuss any uncontrolled symptoms with your nurse.   Your doctor will most likely have prescribed pain medications.      If you are going home after surgery you will receive individualized written care instructions before being discharged.  A responsible adult must drive you to and from the hospital on the day of your surgery and stay with you for 24 hours.  Discharge prescriptions can be filled by the hospital pharmacy during regular pharmacy hours.  If you are having surgery late in the day/evening your prescription may be e-prescribed to your pharmacy.  Please verify your pharmacy hours or chose a 24 hour pharmacy to avoid not having access to your prescription because your  pharmacy has closed for the day.    If you are staying overnight following surgery, you will be transported to your hospital room following the recovery period.  [facility] has all private rooms.    If you have any questions please call Pre-Admission Testing at [phone].  Deductibles and co-payments are collected on the day of service. Please be prepared to pay the required co-pay, deductible or deposit on the day of service as defined by your plan.    Patient Education for Self-Quarantine Process    • Following your COVID testing, we strongly recommend that you wear a mask when you are with other people and practice social distancing.   • Limit your activities to only required outings.  • Wash your hands with soap and water frequently for at least 20 seconds.   • Avoid touching your eyes, nose and mouth with unwashed hands.  • Do not share anything - utensils, drinking glasses, food from the same bowl.   • Sanitize household surfaces daily. Include all high touch areas (door handles, light switches, phones, countertops, etc.)    Call your surgeon immediately if you experience any of the following symptoms:  • Sore Throat  • Shortness of Breath or difficulty breathing  • Cough  • Chills  • Body soreness or muscle pain  • Headache  • Fever  • New loss of taste or smell  • Do not arrive for your surgery ill.  Your procedure will need to be rescheduled to another time.  You will need to call your physician before the day of surgery to avoid any unnecessary exposure to hospital staff as well as other patients.

## 2022-02-14 ENCOUNTER — TELEPHONE (OUTPATIENT)
Dept: SURGERY | Facility: CLINIC | Age: 62
End: 2022-02-14

## 2022-02-14 NOTE — TELEPHONE ENCOUNTER
I called patient to let her know that her genetic testing was negative for a mutation.      Dr. Rich will give her a copy of the results at her postoperative appointment.

## 2022-02-26 ENCOUNTER — APPOINTMENT (OUTPATIENT)
Dept: LAB | Facility: HOSPITAL | Age: 62
End: 2022-02-26

## 2022-02-28 ENCOUNTER — LAB (OUTPATIENT)
Dept: LAB | Facility: HOSPITAL | Age: 62
End: 2022-02-28

## 2022-02-28 ENCOUNTER — TELEPHONE (OUTPATIENT)
Dept: SURGERY | Facility: CLINIC | Age: 62
End: 2022-02-28

## 2022-02-28 DIAGNOSIS — Z17.0 MALIGNANT NEOPLASM OF OVERLAPPING SITES OF LEFT BREAST IN FEMALE, ESTROGEN RECEPTOR POSITIVE: ICD-10-CM

## 2022-02-28 DIAGNOSIS — C50.812 MALIGNANT NEOPLASM OF OVERLAPPING SITES OF LEFT BREAST IN FEMALE, ESTROGEN RECEPTOR POSITIVE: ICD-10-CM

## 2022-02-28 LAB — SARS-COV-2 ORF1AB RESP QL NAA+PROBE: NOT DETECTED

## 2022-02-28 PROCEDURE — C9803 HOPD COVID-19 SPEC COLLECT: HCPCS

## 2022-02-28 PROCEDURE — U0004 COV-19 TEST NON-CDC HGH THRU: HCPCS

## 2022-03-01 ENCOUNTER — HOSPITAL ENCOUNTER (OUTPATIENT)
Facility: HOSPITAL | Age: 62
Setting detail: HOSPITAL OUTPATIENT SURGERY
Discharge: HOME | End: 2022-03-01
Attending: SURGERY | Admitting: SURGERY

## 2022-03-01 ENCOUNTER — ANESTHESIA (OUTPATIENT)
Dept: PERIOP | Facility: HOSPITAL | Age: 62
End: 2022-03-01

## 2022-03-01 ENCOUNTER — APPOINTMENT (OUTPATIENT)
Dept: NUCLEAR MEDICINE | Facility: HOSPITAL | Age: 62
End: 2022-03-01

## 2022-03-01 ENCOUNTER — HOSPITAL ENCOUNTER (OUTPATIENT)
Dept: NUCLEAR MEDICINE | Facility: HOSPITAL | Age: 62
Discharge: HOME | End: 2022-03-01

## 2022-03-01 ENCOUNTER — APPOINTMENT (OUTPATIENT)
Dept: GENERAL RADIOLOGY | Facility: HOSPITAL | Age: 62
End: 2022-03-01

## 2022-03-01 ENCOUNTER — HOSPITAL ENCOUNTER (OUTPATIENT)
Dept: MAMMOGRAPHY | Facility: HOSPITAL | Age: 62
Discharge: HOME | End: 2022-03-01

## 2022-03-01 ENCOUNTER — ANESTHESIA EVENT (OUTPATIENT)
Dept: PERIOP | Facility: HOSPITAL | Age: 62
End: 2022-03-01

## 2022-03-01 VITALS
RESPIRATION RATE: 16 BRPM | DIASTOLIC BLOOD PRESSURE: 71 MMHG | HEART RATE: 79 BPM | SYSTOLIC BLOOD PRESSURE: 117 MMHG | HEIGHT: 68 IN | BODY MASS INDEX: 31.11 KG/M2 | OXYGEN SATURATION: 96 % | TEMPERATURE: 97.6 F | WEIGHT: 205.25 LBS

## 2022-03-01 DIAGNOSIS — C50.812 MALIGNANT NEOPLASM OF OVERLAPPING SITES OF LEFT BREAST IN FEMALE, ESTROGEN RECEPTOR POSITIVE: ICD-10-CM

## 2022-03-01 DIAGNOSIS — Z17.0 MALIGNANT NEOPLASM OF OVERLAPPING SITES OF LEFT BREAST IN FEMALE, ESTROGEN RECEPTOR POSITIVE: ICD-10-CM

## 2022-03-01 PROCEDURE — 25010000002 HEPARIN (PORCINE) PER 1000 UNITS: Performed by: SURGERY

## 2022-03-01 PROCEDURE — 38900 IO MAP OF SENT LYMPH NODE: CPT | Performed by: SURGERY

## 2022-03-01 PROCEDURE — 25010000002 ONDANSETRON PER 1 MG: Performed by: NURSE ANESTHETIST, CERTIFIED REGISTERED

## 2022-03-01 PROCEDURE — 76098 X-RAY EXAM SURGICAL SPECIMEN: CPT

## 2022-03-01 PROCEDURE — A9520 TC99 TILMANOCEPT DIAG 0.5MCI: HCPCS | Performed by: SURGERY

## 2022-03-01 PROCEDURE — C1889 IMPLANT/INSERT DEVICE, NOC: HCPCS | Performed by: SURGERY

## 2022-03-01 PROCEDURE — 88342 IMHCHEM/IMCYTCHM 1ST ANTB: CPT | Performed by: SURGERY

## 2022-03-01 PROCEDURE — 0 TECHETIUM TC99M TILMANOCEPT: Performed by: SURGERY

## 2022-03-01 PROCEDURE — 38792 RA TRACER ID OF SENTINL NODE: CPT

## 2022-03-01 PROCEDURE — 0 LIDOCAINE 1 % SOLUTION: Performed by: RADIOLOGY

## 2022-03-01 PROCEDURE — C1819 TISSUE LOCALIZATION-EXCISION: HCPCS

## 2022-03-01 PROCEDURE — 38525 BIOPSY/REMOVAL LYMPH NODES: CPT | Performed by: SURGERY

## 2022-03-01 PROCEDURE — 25010000002 KETOROLAC TROMETHAMINE PER 15 MG: Performed by: NURSE ANESTHETIST, CERTIFIED REGISTERED

## 2022-03-01 PROCEDURE — 88341 IMHCHEM/IMCYTCHM EA ADD ANTB: CPT | Performed by: SURGERY

## 2022-03-01 PROCEDURE — 25010000002 FENTANYL CITRATE (PF) 50 MCG/ML SOLUTION: Performed by: NURSE ANESTHETIST, CERTIFIED REGISTERED

## 2022-03-01 PROCEDURE — 25010000002 PHENYLEPHRINE 10 MG/ML SOLUTION: Performed by: NURSE ANESTHETIST, CERTIFIED REGISTERED

## 2022-03-01 PROCEDURE — 19301 PARTIAL MASTECTOMY: CPT | Performed by: SURGERY

## 2022-03-01 PROCEDURE — 78195 LYMPH SYSTEM IMAGING: CPT | Performed by: SURGERY

## 2022-03-01 PROCEDURE — 25010000002 PROPOFOL 10 MG/ML EMULSION: Performed by: NURSE ANESTHETIST, CERTIFIED REGISTERED

## 2022-03-01 PROCEDURE — 88307 TISSUE EXAM BY PATHOLOGIST: CPT | Performed by: SURGERY

## 2022-03-01 PROCEDURE — 14001 TIS TRNFR TRUNK 10.1-30SQCM: CPT | Performed by: SURGERY

## 2022-03-01 DEVICE — LIGACLIP MCA MULTIPLE CLIP APPLIERS, 20 SMALL CLIPS
Type: IMPLANTABLE DEVICE | Site: BREAST | Status: FUNCTIONAL
Brand: LIGACLIP

## 2022-03-01 RX ORDER — MAGNESIUM HYDROXIDE 1200 MG/15ML
LIQUID ORAL AS NEEDED
Status: DISCONTINUED | OUTPATIENT
Start: 2022-03-01 | End: 2022-03-01 | Stop reason: HOSPADM

## 2022-03-01 RX ORDER — LIDOCAINE HYDROCHLORIDE 20 MG/ML
INJECTION, SOLUTION INFILTRATION; PERINEURAL AS NEEDED
Status: DISCONTINUED | OUTPATIENT
Start: 2022-03-01 | End: 2022-03-01 | Stop reason: SURG

## 2022-03-01 RX ORDER — DROPERIDOL 2.5 MG/ML
0.62 INJECTION, SOLUTION INTRAMUSCULAR; INTRAVENOUS ONCE AS NEEDED
Status: DISCONTINUED | OUTPATIENT
Start: 2022-03-01 | End: 2022-03-01 | Stop reason: HOSPADM

## 2022-03-01 RX ORDER — BUPIVACAINE HYDROCHLORIDE AND EPINEPHRINE 2.5; 5 MG/ML; UG/ML
INJECTION, SOLUTION INFILTRATION; PERINEURAL AS NEEDED
Status: DISCONTINUED | OUTPATIENT
Start: 2022-03-01 | End: 2022-03-01 | Stop reason: HOSPADM

## 2022-03-01 RX ORDER — HYDROMORPHONE HYDROCHLORIDE 1 MG/ML
0.5 INJECTION, SOLUTION INTRAMUSCULAR; INTRAVENOUS; SUBCUTANEOUS
Status: DISCONTINUED | OUTPATIENT
Start: 2022-03-01 | End: 2022-03-01 | Stop reason: HOSPADM

## 2022-03-01 RX ORDER — LIDOCAINE HYDROCHLORIDE 10 MG/ML
0.5 INJECTION, SOLUTION EPIDURAL; INFILTRATION; INTRACAUDAL; PERINEURAL ONCE AS NEEDED
Status: DISCONTINUED | OUTPATIENT
Start: 2022-03-01 | End: 2022-03-01 | Stop reason: HOSPADM

## 2022-03-01 RX ORDER — SCOLOPAMINE TRANSDERMAL SYSTEM 1 MG/1
1 PATCH, EXTENDED RELEASE TRANSDERMAL ONCE
Status: DISCONTINUED | OUTPATIENT
Start: 2022-03-01 | End: 2022-03-01 | Stop reason: HOSPADM

## 2022-03-01 RX ORDER — KETOROLAC TROMETHAMINE 30 MG/ML
INJECTION, SOLUTION INTRAMUSCULAR; INTRAVENOUS AS NEEDED
Status: DISCONTINUED | OUTPATIENT
Start: 2022-03-01 | End: 2022-03-01 | Stop reason: SURG

## 2022-03-01 RX ORDER — EPHEDRINE SULFATE 50 MG/ML
INJECTION, SOLUTION INTRAVENOUS AS NEEDED
Status: DISCONTINUED | OUTPATIENT
Start: 2022-03-01 | End: 2022-03-01 | Stop reason: SURG

## 2022-03-01 RX ORDER — IBUPROFEN 600 MG/1
600 TABLET ORAL ONCE AS NEEDED
Status: DISCONTINUED | OUTPATIENT
Start: 2022-03-01 | End: 2022-03-01 | Stop reason: HOSPADM

## 2022-03-01 RX ORDER — ONDANSETRON 2 MG/ML
4 INJECTION INTRAMUSCULAR; INTRAVENOUS ONCE AS NEEDED
Status: DISCONTINUED | OUTPATIENT
Start: 2022-03-01 | End: 2022-03-01 | Stop reason: HOSPADM

## 2022-03-01 RX ORDER — FENTANYL CITRATE 50 UG/ML
INJECTION, SOLUTION INTRAMUSCULAR; INTRAVENOUS AS NEEDED
Status: DISCONTINUED | OUTPATIENT
Start: 2022-03-01 | End: 2022-03-01 | Stop reason: SURG

## 2022-03-01 RX ORDER — OXYCODONE AND ACETAMINOPHEN 7.5; 325 MG/1; MG/1
1 TABLET ORAL EVERY 4 HOURS PRN
Status: DISCONTINUED | OUTPATIENT
Start: 2022-03-01 | End: 2022-03-01 | Stop reason: HOSPADM

## 2022-03-01 RX ORDER — FLUTICASONE PROPIONATE 50 MCG
1 SPRAY, SUSPENSION (ML) NASAL AS NEEDED
COMMUNITY
Start: 2022-02-11 | End: 2022-09-20

## 2022-03-01 RX ORDER — ACETAMINOPHEN 500 MG
1000 TABLET ORAL EVERY 8 HOURS
Qty: 30 TABLET | Refills: 0 | Status: SHIPPED | OUTPATIENT
Start: 2022-03-01 | End: 2022-03-06

## 2022-03-01 RX ORDER — NALOXONE HCL 0.4 MG/ML
0.2 VIAL (ML) INJECTION AS NEEDED
Status: DISCONTINUED | OUTPATIENT
Start: 2022-03-01 | End: 2022-03-01 | Stop reason: HOSPADM

## 2022-03-01 RX ORDER — IPRATROPIUM BROMIDE AND ALBUTEROL SULFATE 2.5; .5 MG/3ML; MG/3ML
3 SOLUTION RESPIRATORY (INHALATION) ONCE AS NEEDED
Status: DISCONTINUED | OUTPATIENT
Start: 2022-03-01 | End: 2022-03-01 | Stop reason: HOSPADM

## 2022-03-01 RX ORDER — SODIUM CHLORIDE, SODIUM LACTATE, POTASSIUM CHLORIDE, CALCIUM CHLORIDE 600; 310; 30; 20 MG/100ML; MG/100ML; MG/100ML; MG/100ML
9 INJECTION, SOLUTION INTRAVENOUS CONTINUOUS
Status: DISCONTINUED | OUTPATIENT
Start: 2022-03-01 | End: 2022-03-01 | Stop reason: HOSPADM

## 2022-03-01 RX ORDER — EPHEDRINE SULFATE 50 MG/ML
5 INJECTION, SOLUTION INTRAVENOUS ONCE AS NEEDED
Status: DISCONTINUED | OUTPATIENT
Start: 2022-03-01 | End: 2022-03-01 | Stop reason: HOSPADM

## 2022-03-01 RX ORDER — FENTANYL CITRATE 50 UG/ML
50 INJECTION, SOLUTION INTRAMUSCULAR; INTRAVENOUS
Status: DISCONTINUED | OUTPATIENT
Start: 2022-03-01 | End: 2022-03-01 | Stop reason: HOSPADM

## 2022-03-01 RX ORDER — HEPARIN SODIUM 5000 [USP'U]/ML
5000 INJECTION, SOLUTION INTRAVENOUS; SUBCUTANEOUS
Status: COMPLETED | OUTPATIENT
Start: 2022-03-01 | End: 2022-03-01

## 2022-03-01 RX ORDER — GLYCOPYRROLATE 0.2 MG/ML
INJECTION INTRAMUSCULAR; INTRAVENOUS AS NEEDED
Status: DISCONTINUED | OUTPATIENT
Start: 2022-03-01 | End: 2022-03-01 | Stop reason: SURG

## 2022-03-01 RX ORDER — PROPOFOL 10 MG/ML
VIAL (ML) INTRAVENOUS AS NEEDED
Status: DISCONTINUED | OUTPATIENT
Start: 2022-03-01 | End: 2022-03-01 | Stop reason: SURG

## 2022-03-01 RX ORDER — ISOSULFAN BLUE 50 MG/5ML
INJECTION, SOLUTION SUBCUTANEOUS AS NEEDED
Status: DISCONTINUED | OUTPATIENT
Start: 2022-03-01 | End: 2022-03-01 | Stop reason: HOSPADM

## 2022-03-01 RX ORDER — TRAMADOL HYDROCHLORIDE 50 MG/1
50 TABLET ORAL ONCE AS NEEDED
Status: COMPLETED | OUTPATIENT
Start: 2022-03-01 | End: 2022-03-01

## 2022-03-01 RX ORDER — DIPHENHYDRAMINE HCL 25 MG
25 CAPSULE ORAL
Status: DISCONTINUED | OUTPATIENT
Start: 2022-03-01 | End: 2022-03-01 | Stop reason: HOSPADM

## 2022-03-01 RX ORDER — TRAMADOL HYDROCHLORIDE 50 MG/1
50 TABLET ORAL EVERY 8 HOURS PRN
Qty: 12 TABLET | Refills: 0 | Status: SHIPPED | OUTPATIENT
Start: 2022-03-01 | End: 2022-03-16

## 2022-03-01 RX ORDER — PHENYLEPHRINE HYDROCHLORIDE 10 MG/ML
INJECTION INTRAVENOUS AS NEEDED
Status: DISCONTINUED | OUTPATIENT
Start: 2022-03-01 | End: 2022-03-01 | Stop reason: SURG

## 2022-03-01 RX ORDER — ONDANSETRON 2 MG/ML
INJECTION INTRAMUSCULAR; INTRAVENOUS AS NEEDED
Status: DISCONTINUED | OUTPATIENT
Start: 2022-03-01 | End: 2022-03-01 | Stop reason: SURG

## 2022-03-01 RX ORDER — CLINDAMYCIN PHOSPHATE 900 MG/50ML
900 INJECTION INTRAVENOUS ONCE
Status: COMPLETED | OUTPATIENT
Start: 2022-03-01 | End: 2022-03-01

## 2022-03-01 RX ORDER — DIPHENHYDRAMINE HYDROCHLORIDE 50 MG/ML
12.5 INJECTION INTRAMUSCULAR; INTRAVENOUS
Status: DISCONTINUED | OUTPATIENT
Start: 2022-03-01 | End: 2022-03-01 | Stop reason: HOSPADM

## 2022-03-01 RX ORDER — DIAZEPAM 5 MG/1
10 TABLET ORAL ONCE
Status: COMPLETED | OUTPATIENT
Start: 2022-03-01 | End: 2022-03-01

## 2022-03-01 RX ORDER — LABETALOL HYDROCHLORIDE 5 MG/ML
5 INJECTION, SOLUTION INTRAVENOUS
Status: DISCONTINUED | OUTPATIENT
Start: 2022-03-01 | End: 2022-03-01 | Stop reason: HOSPADM

## 2022-03-01 RX ORDER — SODIUM CHLORIDE, SODIUM LACTATE, POTASSIUM CHLORIDE, CALCIUM CHLORIDE 600; 310; 30; 20 MG/100ML; MG/100ML; MG/100ML; MG/100ML
100 INJECTION, SOLUTION INTRAVENOUS CONTINUOUS
Status: DISCONTINUED | OUTPATIENT
Start: 2022-03-01 | End: 2022-03-01 | Stop reason: HOSPADM

## 2022-03-01 RX ORDER — CHLORCYCLIZINE HYDROCHLORIDE AND PSEUDOEPHEDRINE HYDROCHLORIDE 25; 60 MG/1; MG/1
0.5 TABLET ORAL AS NEEDED
COMMUNITY
Start: 2022-02-11 | End: 2022-09-20

## 2022-03-01 RX ORDER — FLUMAZENIL 0.1 MG/ML
0.2 INJECTION INTRAVENOUS AS NEEDED
Status: DISCONTINUED | OUTPATIENT
Start: 2022-03-01 | End: 2022-03-01 | Stop reason: HOSPADM

## 2022-03-01 RX ORDER — FAMOTIDINE 10 MG/ML
20 INJECTION, SOLUTION INTRAVENOUS ONCE
Status: COMPLETED | OUTPATIENT
Start: 2022-03-01 | End: 2022-03-01

## 2022-03-01 RX ORDER — SODIUM CHLORIDE 0.9 % (FLUSH) 0.9 %
3 SYRINGE (ML) INJECTION EVERY 12 HOURS SCHEDULED
Status: DISCONTINUED | OUTPATIENT
Start: 2022-03-01 | End: 2022-03-01 | Stop reason: HOSPADM

## 2022-03-01 RX ORDER — LIDOCAINE HYDROCHLORIDE 10 MG/ML
3 INJECTION, SOLUTION INFILTRATION; PERINEURAL ONCE
Status: COMPLETED | OUTPATIENT
Start: 2022-03-01 | End: 2022-03-01

## 2022-03-01 RX ORDER — HYDRALAZINE HYDROCHLORIDE 20 MG/ML
5 INJECTION INTRAMUSCULAR; INTRAVENOUS
Status: DISCONTINUED | OUTPATIENT
Start: 2022-03-01 | End: 2022-03-01 | Stop reason: HOSPADM

## 2022-03-01 RX ORDER — HYDROCODONE BITARTRATE AND ACETAMINOPHEN 7.5; 325 MG/1; MG/1
1 TABLET ORAL ONCE AS NEEDED
Status: DISCONTINUED | OUTPATIENT
Start: 2022-03-01 | End: 2022-03-01 | Stop reason: HOSPADM

## 2022-03-01 RX ORDER — MIDAZOLAM HYDROCHLORIDE 1 MG/ML
1 INJECTION INTRAMUSCULAR; INTRAVENOUS
Status: DISCONTINUED | OUTPATIENT
Start: 2022-03-01 | End: 2022-03-01 | Stop reason: HOSPADM

## 2022-03-01 RX ORDER — SODIUM CHLORIDE 0.9 % (FLUSH) 0.9 %
3-10 SYRINGE (ML) INJECTION AS NEEDED
Status: DISCONTINUED | OUTPATIENT
Start: 2022-03-01 | End: 2022-03-01 | Stop reason: HOSPADM

## 2022-03-01 RX ADMIN — PROPOFOL 25 MCG/KG/MIN: 10 INJECTION, EMULSION INTRAVENOUS at 10:00

## 2022-03-01 RX ADMIN — SODIUM CHLORIDE, POTASSIUM CHLORIDE, SODIUM LACTATE AND CALCIUM CHLORIDE 9 ML/HR: 600; 310; 30; 20 INJECTION, SOLUTION INTRAVENOUS at 07:05

## 2022-03-01 RX ADMIN — SCOPALAMINE 1 PATCH: 1 PATCH, EXTENDED RELEASE TRANSDERMAL at 09:36

## 2022-03-01 RX ADMIN — LIDOCAINE HYDROCHLORIDE 50 MG: 20 INJECTION, SOLUTION INFILTRATION; PERINEURAL at 10:00

## 2022-03-01 RX ADMIN — GLYCOPYRROLATE 0.2 MG: 0.2 INJECTION INTRAMUSCULAR; INTRAVENOUS at 10:06

## 2022-03-01 RX ADMIN — PROPOFOL 200 MG: 10 INJECTION, EMULSION INTRAVENOUS at 10:00

## 2022-03-01 RX ADMIN — FAMOTIDINE 20 MG: 10 INJECTION INTRAVENOUS at 09:37

## 2022-03-01 RX ADMIN — FENTANYL CITRATE 25 MCG: 0.05 INJECTION, SOLUTION INTRAMUSCULAR; INTRAVENOUS at 11:46

## 2022-03-01 RX ADMIN — PROPOFOL 20 MG: 10 INJECTION, EMULSION INTRAVENOUS at 11:06

## 2022-03-01 RX ADMIN — FENTANYL CITRATE 50 MCG: 0.05 INJECTION, SOLUTION INTRAMUSCULAR; INTRAVENOUS at 09:55

## 2022-03-01 RX ADMIN — ONDANSETRON 4 MG: 2 INJECTION INTRAMUSCULAR; INTRAVENOUS at 11:47

## 2022-03-01 RX ADMIN — EPHEDRINE SULFATE 10 MG: 50 INJECTION INTRAVENOUS at 10:23

## 2022-03-01 RX ADMIN — PHENYLEPHRINE HYDROCHLORIDE 100 MCG: 10 INJECTION, SOLUTION INTRAVENOUS at 10:14

## 2022-03-01 RX ADMIN — KETOROLAC TROMETHAMINE 30 MG: 30 INJECTION, SOLUTION INTRAMUSCULAR at 11:47

## 2022-03-01 RX ADMIN — TRAMADOL HYDROCHLORIDE 50 MG: 50 TABLET, COATED ORAL at 12:42

## 2022-03-01 RX ADMIN — TILMANOCEPT 1 DOSE: KIT at 09:21

## 2022-03-01 RX ADMIN — Medication 3 ML: at 09:17

## 2022-03-01 RX ADMIN — PHENYLEPHRINE HYDROCHLORIDE 100 MCG: 10 INJECTION, SOLUTION INTRAVENOUS at 10:16

## 2022-03-01 RX ADMIN — HEPARIN SODIUM 5000 UNITS: 5000 INJECTION INTRAVENOUS; SUBCUTANEOUS at 09:37

## 2022-03-01 RX ADMIN — FENTANYL CITRATE 25 MCG: 0.05 INJECTION, SOLUTION INTRAMUSCULAR; INTRAVENOUS at 11:06

## 2022-03-01 RX ADMIN — EPHEDRINE SULFATE 10 MG: 50 INJECTION INTRAVENOUS at 10:19

## 2022-03-01 RX ADMIN — DIAZEPAM 10 MG: 5 TABLET ORAL at 08:00

## 2022-03-01 RX ADMIN — CLINDAMYCIN PHOSPHATE 900 MG: 900 INJECTION, SOLUTION INTRAVENOUS at 09:37

## 2022-03-01 NOTE — OP NOTE
Operative Report    Patient Name:  Cris Martin  YOB: 1960    Date of Surgery:  3/1/2022    Pre-op Diagnosis:   Malignant neoplasm of overlapping sites of left breast in female, estrogen receptor positive (HCC) [C50.812, Z17.0]       Post-Op Diagnosis:  Malignant neoplasm of overlapping sites of left breast in female, estrogen receptor positive (HCC) [C50.812, Z17.0]    Procedures:  1.  Left needle-localized partial mastectomy   2.  Left axillary deep sentinel lymph node biopsy  3.  Left breast tissue rearrangement with local tissue flaps      Staff:  Surgeon(s):  Fabiola Rich MD      Anesthesia: General    Estimated Blood Loss: 15 mL    Implants:    Implant Name Type Inv. Item Serial No.  Lot No. LRB No. Used Action   CLIPAPPLR M/ ENDO LIGACLIP 9 3/8IN  - [id number] Implant CLIPAPPLR M/ ENDO LIGACLIP 9 3/8IN   [employer]  [employer] . Left 1 Implanted       Specimen:          Specimens     ID Source Type Tests Collected By Collected At Frozen?    A Breast, Left Tissue · TISSUE PATHOLOGY EXAM   Fabiola Rich MD 3/1/22 1030 No    Description: Left partial mastectomy- ink marks margins.    Comment: Fresh for permanent    B Breast, Left Tissue · TISSUE PATHOLOGY EXAM   Fabiola Rich MD 3/1/22 1038 No    Description: Left breast- additional superior/medial- ink marks margins    Comment: Fresh for permanent    C Breast, Left Tissue · TISSUE PATHOLOGY EXAM   Fabiola Rich MD 3/1/22 1042 No    Description: left additional inferior/ medial corner - ink marks margins    Comment: Fresh for permanent    D [place] Lymph Node Tissue · TISSUE PATHOLOGY EXAM   Fabiola Rich MD 3/1/22 1106 No    Description: Left axilla sentinel node #1, hot and blue, count- 9200    E [place] Lymph Node Tissue · TISSUE PATHOLOGY EXAM   Fabiola Rich MD 3/1/22 1116 No    Description: Left axilla sentinel node #2, hot and blue, count- 3900            Findings:  Wire and clip in good position in specimen radiograph.    Complications: None     Indications: The patient is a 61 y.o. F with a new diagnosis of left breast cancer: Low grade, invasive ductal carcinoma, ER/WI+, Her2 negative; clinical T1bN0, anatomic stage IA, prognostic stage IA. She is a good candidate for lumpectomy and she desires breast conservation. The lesion required pre-operative wire-localization. The risks and benefits were discussed preoperatively and she understood and agreed to proceed.       Description of Procedure:  Preoperatively the patient was taken to the radiology department and the lesion was localized with a needle/wire. The patient then proceeded to the nuclear medicine suite and the periareolar skin was injected with Lymphoseek. I reviewed the post-localization mammogram to determine the trajectory of the wire. The patient was identified in the preoperative area and brought to the operating room and placed supine on the table. Patient verification was performed and general anesthesia was induced. The left breast was cleansed with an alcohol prep and 5 ml of lymphazurin blue was injected in the retroareolar area. She tolerated this well. The patient was prepped and draped in sterile fashion with the left arm prepped into the field and the wire/needle prepped into the field. A time out was performed and all were in agreement. I confirmed that the patient had received preoperative antibiotics and chemical prophylaxis.     I began with the partial mastectomy. On the skin, I marked the suggested location of the lesion based on the mammographic localization imaging. I used this to plan my intended area of resection and incision, and marked these as well. The skin was infiltrated with local anesthesia. A superior curvilinear incision was made with a scalpel, and carried down through the dermis with sharp dissection. Flaps were raised according to the planned dissection. An anterior flap was  raised first. Dissection was then carried out superiorly, inferiorly, medially, and laterally according to the planned area of resection. The wire was delivered into the wound. The posterior dissection extended to the pectoralis major muscle. The specimen, including the pectoralis fascia was lifted off the underlying muscle and removed. The specimen was oriented with paint (red - superior, green - anterior, blue - inferior, yellow - medial, orange - lateral, black - posterior). Specimen radiograph showed the wire and clip located near the superomedial aspect of the specimen, so additional margins were taken. An additional superior and medial margin was taken as a single specimen. A nodule that looked like fat necrosis was noted in the inferomedial corner, so this was taken as a single specimen. These were inked with the corresponding colors and inked marked the true margins. The wound was irrigated and hemostasis achieved. Additional local anesthesia was infiltrated into the breast cavity. Marking clips were placed in the breast cavity.     I then proceeded with the sentinel node biopsy. The navigator gamma probe was used to find the area of increased uptake in the axilla. The skin was infiltrated with local anesthetic. A small incision was made below the hairbearing region of the axilla. Dissection was taken down through the fat and subcutaneous tissue until the clavipectoral fascia was reached. The fascia was incised and the axilla entered. The navigator gamma probe was used to identify the area of increased uptake. I dissected in the area of increased uptake, taking care to note any blue channels traveling in this direction. This node was carefully dissected out and removed, taking care to clip all lymphatic channels. The node was hot and blue. Ex vivo counts were 9200. An additional area of increased uptake was identified. This node was carefully dissected out and removed, taking care to clip all lymphatic  channels. The node was hot and blue. Ex vivo counts on SLN #2 measured 3900. Background count was 25, there were no additional hot or blue nodes, and the node biopsy was terminated. The wound was irrigated and hemostasis achieved.    Attention was then turned to reconstructing the breast. The breast deformity measured approximately 16 sq cm. The deep aspect of the breast parenchyma was dissected off the pectoralis muscle fascia for a distance of 1 cm in all directions in order to mobilize the tissue into the defect. The parenchyma was reapproximated with interrupted 3-0 vicryl stitches. After reconstruction, there was a normal breast contour without any skin dimpling. In the axilla, the clavipectoral fascia was reapproximated with interrupted 3-0 vicryl suture. The deep dermis of both wounds was then closed with interrupted 3-0 vicryl suture. The skin of both wounds was closed with 4-0 subcuticular running monocryl and covered with dermabond. Counts were correct at the end of the case. The patient was awakened from anesthesia and taken to the PACU in stable condition.    Fabiola Rich MD     Date: 3/1/2022  Time: 13:01 EST

## 2022-03-01 NOTE — ANESTHESIA POSTPROCEDURE EVALUATION
"Patient: Cris Martin    Procedure Summary     Date: 03/01/22 Room / Location: [facility] OR  / [facility] MAIN OR    Anesthesia Start: 0949 Anesthesia Stop: 1202    Procedure: Left needle-localized partial mastectomy and sentinel lymph node biopsy (Left Breast) Diagnosis:       Malignant neoplasm of overlapping sites of left breast in female, estrogen receptor positive (HCC)      (Malignant neoplasm of overlapping sites of left breast in female, estrogen receptor positive (HCC) [C50.812, Z17.0])    Surgeons: Fabiola Rich MD Provider: Kristen Angeles MD    Anesthesia Type: general ASA Status: 2          Anesthesia Type: general    Vitals  Vitals Value Taken Time   /69 03/01/22 1301   Temp 36.4 °C (97.6 °F) 03/01/22 1158   Pulse 83 03/01/22 1304   Resp 18 03/01/22 1300   SpO2 95 % 03/01/22 1304   Vitals shown include unvalidated device data.        Post Anesthesia Care and Evaluation    Patient location during evaluation: PHASE II  Patient participation: complete - patient participated  Level of consciousness: awake and alert  Pain management: adequate  Airway patency: patent  Anesthetic complications: No anesthetic complications    Cardiovascular status: acceptable  Respiratory status: acceptable  Hydration status: acceptable    Comments: /76   Pulse 82   Temp 36.4 °C (97.6 °F) (Oral)   Resp 16   Ht 172.7 cm (68\")   Wt 93.1 kg (205 lb 4 oz)   SpO2 96%   BMI 31.21 kg/m²       "

## 2022-03-01 NOTE — ANESTHESIA PREPROCEDURE EVALUATION
" Anesthesia Evaluation     Patient summary reviewed and Nursing notes reviewed   history of anesthetic complications (High risk for PONV): PONV  NPO Solid Status: > 8 hours  NPO Liquid Status: > 2 hours           Airway   Mallampati: II  TM distance: >3 FB  Neck ROM: full  No difficulty expected  Dental    (+) implants        Pulmonary - normal exam   Cardiovascular - normal exam  Exercise tolerance: good (4-7 METS)    ECG reviewed    (-) angina, orthopnea, PND, ENCARNACION      Neuro/Psych  GI/Hepatic/Renal/Endo    (+) obesity,     (-) GERD    Musculoskeletal     Abdominal   (+) obese,    Substance History      OB/GYN          Other   arthritis,    history of cancer                    Anesthesia Plan    ASA 2     general   (Propofol gtt for PONV prophylaxis, 3+ risk factors    Scop patch    I have reviewed the patient's history and chart with the patient, including all pertinent laboratory results and imaging. I have explained the risks of anesthesia including but not limited to dental damage, corneal abrasion, nerve injury, MI, stroke, aspiration, and death. Patient has agreed to proceed.     /86 (BP Location: Right arm, Patient Position: Lying)   Pulse 76   Temp 36.8 °C (98.3 °F) (Oral)   Resp 16   Ht 172.7 cm (68\")   Wt 93.1 kg (205 lb 4 oz)   SpO2 97%   BMI 31.21 kg/m²   )  intravenous induction     Anesthetic plan, all risks, benefits, and alternatives have been provided, discussed and informed consent has been obtained with: patient.        CODE STATUS:       "

## 2022-03-01 NOTE — ANESTHESIA PROCEDURE NOTES
Airway  Urgency: elective    Date/Time: 3/1/2022 9:59 AM  Airway not difficult    General Information and Staff    Patient location during procedure: OR  Anesthesiologist: Charli Chris MD  CRNA: Tarah Willis CRNA    Indications and Patient Condition  Indications for airway management: airway protection    Preoxygenated: yes  MILS maintained throughout  Mask difficulty assessment: 0 - not attempted    Final Airway Details  Final airway type: supraglottic airway      Successful airway: classic  Size 4    Number of attempts at approach: 1  Assessment: lips, teeth, and gum same as pre-op    Additional Comments  Placed with ease. Vent with ease. Teeth as pre-op. Secured to face.

## 2022-03-01 NOTE — DISCHARGE INSTRUCTIONS
Allow dermabond to come off on its own. Ok to shower in 48 hrs. No tub baths for 2 weeks.   Help patient put on bra prior to discharge. If incision is near a seam, pad under seam with gauze. Wear bra day & night until first appointment.   No lifting or pulling with arm on the side of operation until cleared by MD.   Call the office at [phone] with any questions. Also call for temp>101.5, redness, drainage, or opening of incision.        Scopolamine Patch  This patch has been applied to the skin behind one of your ears.  It may stay in place up to 24 hours. You may remove it at any time after your surgery; however, it should be removed after you are up and walking around the next day.  This medicine reduces stomach upset. Side effects may include: dry mouth, dizziness, sleepiness, constipation, or upset stomach.  An allergy would show up as: a rash, itching, wheezing or shortness of breath.  Follow these instructions:  1. Do not drink alcohol, drive or operate machinery while taking this medicine.  2. Wear only 1 patch at a time. You can leave the patch on for up to 24 hours.  3. When you remove the patch, fold it in half with the sticky sides together and throw it away. Wash your hands and the area under the patch.  4. Do not touch your eye with your hand if it has touched the patch.  5. Wash your hands well before and after touching the patch.  6. Sit or stand slowly to avoid dizziness.  Call your doctor if you have:  1. Any sign of allergy  2. No relief  3. Trouble passing urine  4. Any new or severe symptoms

## 2022-03-03 ENCOUNTER — TELEPHONE (OUTPATIENT)
Dept: SURGERY | Facility: CLINIC | Age: 62
End: 2022-03-03

## 2022-03-03 DIAGNOSIS — Z17.0 MALIGNANT NEOPLASM OF OVERLAPPING SITES OF LEFT BREAST IN FEMALE, ESTROGEN RECEPTOR POSITIVE: Primary | ICD-10-CM

## 2022-03-03 DIAGNOSIS — C50.812 MALIGNANT NEOPLASM OF OVERLAPPING SITES OF LEFT BREAST IN FEMALE, ESTROGEN RECEPTOR POSITIVE: Primary | ICD-10-CM

## 2022-03-03 NOTE — TELEPHONE ENCOUNTER
I called Ms. Martin to discuss her pathology report. She is recovering well from surgery. I will place referrals for med/onc and rad/onc.     Fabiola Rich MD

## 2022-03-16 ENCOUNTER — OFFICE VISIT (OUTPATIENT)
Dept: SURGERY | Facility: CLINIC | Age: 62
End: 2022-03-16

## 2022-03-16 VITALS
HEIGHT: 68 IN | SYSTOLIC BLOOD PRESSURE: 118 MMHG | RESPIRATION RATE: 17 BRPM | BODY MASS INDEX: 31.07 KG/M2 | DIASTOLIC BLOOD PRESSURE: 74 MMHG | HEART RATE: 78 BPM | OXYGEN SATURATION: 98 % | WEIGHT: 205 LBS

## 2022-03-16 DIAGNOSIS — Z48.89 POSTOPERATIVE VISIT: Primary | ICD-10-CM

## 2022-03-16 DIAGNOSIS — Z17.0 MALIGNANT NEOPLASM OF OVERLAPPING SITES OF LEFT BREAST IN FEMALE, ESTROGEN RECEPTOR POSITIVE: ICD-10-CM

## 2022-03-16 DIAGNOSIS — C50.812 MALIGNANT NEOPLASM OF OVERLAPPING SITES OF LEFT BREAST IN FEMALE, ESTROGEN RECEPTOR POSITIVE: ICD-10-CM

## 2022-03-16 PROCEDURE — 99024 POSTOP FOLLOW-UP VISIT: CPT | Performed by: SURGERY

## 2022-03-16 NOTE — PROGRESS NOTES
[facility]     Referring Provider: Eneida Dubois MD     Chief complaint: Postoperative visit     HPI:   2/2/22:  Ms. Cris Martin is a 60 yo woman, seen at the request of Dr. Eneida Dubois, for a new diagnosis of left breast cancer. This was initially detected as an imaging abnormality on routine screening. Her work-up is detailed in the oncologic history below. Prior to the biopsy, she denies any breast lumps, pain, skin changes, or nipple discharge. She denies any prior history of abnormal mammograms or breast biopsies. She has a family history of breast cancer in her maternal grandmother (diagnosed at age 90) and a maternal great aunt (unknown age of diagnosis). She denies any family history of ovarian cancer.     3/16/22, Interval History:  She underwent left partial mastectomy and SLNB on 3/1/22. See surgery & pathology details below in oncologic history. She has been recovering well and has no complaints.        Oncology/Hematology History Overview Note   11/05/2020, Screening MMG with [facility] ( [place]):  There are scattered areas of fibroglandular density. A benign-appearing mass in the slightly upper central posterior right breast is not significantly changed. There are no suspicious masses, calcifications, or areas of architectural distortion.  BI-RADS 2: Benign.     Malignant neoplasm of overlapping sites of left breast in female, estrogen receptor positive (HCC)   11/23/2021 Initial Diagnosis    Malignant neoplasm of overlapping sites of left breast in female, estrogen receptor positive (HCC)     11/24/2021 Imaging    Screening MMG with [facility] ( [place]):  There are scattered areas of fibroglandular density. There is a 0.9 cm mass with corresponding architectural distortion in the upper central posterior left breast. The distortion is best appreciated on Tomosynthesis. There are no suspicious masses, calcifications, or areas of architectural distortion in the right breast.  BI-RADS 0:  Incomplete.     12/27/2021 Imaging    Left Diagnostic MMG with [facility] & [facility]  MMG:  With additional imaging there is persistence of the area of focal asymmetry in the middle-third of the left breast at the 12 o'clock position.  US:  At the 12 o'clock position on the order of 5 cm from the nipple there is a 0.8 cm irregular hypoechoic mass with mild posterior acoustic shadowing.  BI-RADS 4: Suspicious.     1/13/2022 Biopsy    Left Breast, [facility]Guided [facility]):    1. Breast, Left, 12 O'clock, 5 cm from Nipple, Biopsy: Invasive ductal adenocarcinoma and ductal carcinoma in situ.               A. Invasive ductal adenocarcinoma:          1. The invasive ductal adenocarcinoma is Moreland grade I (tubular grade 2, nuclear grade 2, mitotic grade 1).                            2. The invasive ductal adenocarcinoma measures up to 5.8 mm on the slide.                            3. No capillary lymphatic invasion is identified.                                              4. No perineural invasion is seen.                            5. No definitive microcalcifications are present.               B. Ductal carcinoma in situ:                            1. The ductal carcinoma in situ is of intermediate nuclear grade with cribriform architecture and focal necrosis.    ER+ (99%, strong)  MN+ (99%, strong)  HER2 negative (IHC 1+)  Ki-67 10%    -Post-procedural digital mammographic views of the left breast demonstrate the bowtie clip in the upper central posterior left breast approximately 0.9 cm from the residual focal asymmetry.      1/31/2022 Imaging    Bilateral Breast [facility]):  In the left breast centered at the 12 o'clock position on the order of 9 cm from the nipple on this examination, there is a focal signal void that is at the posterior margin of an enhancing lesion that measures 0.6 cm in the anterior-posterior dimension, 0.4 cm in the superior-inferior dimension and 0.6 cm in the  medial-lateral dimension. This represents the biopsy-proven site of malignancy. No other areas of abnormal enhancement or morphology are seen in the left breast. I see no evidence  for abnormal left breast skin, nipple or chest wall enhancement and there is no evidence for left axillary or internal mammary chain adenopathy.  In the right breast in the middle third retroareolar region centered on the order of 7 cm from the nipple, there is an oval circumscribed T2 hyperintense enhancing mass that measures on the order of 1.6 cm in greatest dimension. The mass has been stable when compared to multiple  remote prior mammograms dating back to 2014. This is consistent with a benign fibroadenoma. No areas of abnormal enhancement or otherwise abnormal morphology seen in the right breast. I see no evidence for abnormal skin, nipple or chest wall enhancement of the right breast and there is no evidence for right axillary or internal mammary chain adenopathy.  BI-RADS 6: Known malignancy.     2/2/2022 Genetic Testing    [employer] Common Hereditary Cancers Panel (47 genes):    Negative     3/1/2022 Surgery    Left needle-localized partial mastectomy and sentinel lymph node biopsy    1. Left Breast, Needle-Localized Partial Mastectomy (18 grams):               A. INVASIVE DUCTAL CARCINOMA, Well-differentiated; [place] Histologic Grade I/III      (tubule score = 2, nuclear score = 2, mitoses score = 1):               1. Tumor size:  7 mm (measured on slide 1E).               2. Margins are negative for invasive carcinoma; Closest distance: Invasive carcinoma is present 5 mm from the medial margin (superseded by specimens #2-3).  3. Negative for lymphovascular space invasion.  B. ASSOCIATED LOW GRADE DUCTAL CARCINOMA IN SITU (DCIS):               1. Cribriform and Solid types without necrosis or calcifications.               2. Extent of DCIS: Approximately 20 mm (based on the slices involved).                3. Margins are  negative for in situ carcinoma; Closest distance:  DCIS is present 2 mm from the inferior margin (superseded by specimen #3).                C. Clip and biopsy site changes are present and adjacent to invasive and in situ carcinoma.               D. Background breast parenchyma with calcifications associated with sclerosing adenosis and       fibroadenomatoid change.               E. See Synoptic Report (to include parts #2-5).     2. Left Breast, Additional Superior/Medial Margins, Re-excision:                 A. Benign breast parenchyma with cluster of apocrine cysts (additional 10 mm of tissue free of carcinoma).     3. Left Breast, Additional Inferior/Medial Corner Margin, Re-excision:                A. Benign breast parenchyma with fat necrosis, sclerosing adenosis, and a micropapilloma involved by usual ductal hyperplasia (additional 5 mm of tissue free of carcinoma).      4. Left Axillary [place] Lymph Node #1, Hot and Blue, Count 9200, Biopsy:                 A. One lymph node, negative for carcinoma (0/1).     5.  Left Axillary [place] Lymph Node #2, Hot and Blue, Count 3900, Biopsy:                 A. One lymph node, negative for carcinoma (0/1).         Review of Systems:  See interval history.       Medications:    Current Outpatient Medications:   •  Acetylcysteine (NAC) 500 MG capsule, Take 500 mg by mouth Daily., Disp: , Rfl:   •  Ascorbic Acid (VANDANA-C PO), Take 1,000 mg by mouth Daily., Disp: , Rfl:   •  coenzyme Q10 50 MG capsule capsule, Take 50 mg by mouth Daily., Disp: , Rfl:   •  fluticasone (FLONASE) 50 MCG/ACT nasal spray, 1 spray into the nostril(s) as directed by provider As Needed., Disp: , Rfl:   •  MELALEUCA SC, Take 3 tablets by mouth Daily. REPLENEX, Disp: , Rfl:   •  Quercetin 50 MG tablet, Take 50 mg by mouth Daily., Disp: , Rfl:   •  Stahist AD 25-60 MG tablet, Take 0.5 tablets by mouth As Needed., Disp: , Rfl:   •  Zinc 30 MG capsule, Take 30 mg by mouth Daily., Disp: , Rfl:        Allergies   Allergen Reactions   • Penicillins Hives     CHILDHOOD RX   • Codeine Nausea Only       Family History   Problem Relation Age of Onset   • Breast cancer Maternal Grandmother 90   • Heart attack Maternal Grandfather    • Heart attack Maternal Aunt    • Diabetes Maternal Aunt    • Alzheimer's disease Father    • Breast cancer Other         Maternal great aunt   • Ovarian cancer Neg Hx    • Uterine cancer Neg Hx    • Colon cancer Neg Hx    • Mental illness Neg Hx        PHYSICAL EXAMINATION:   Vitals:    03/16/22 0807   BP: 118/74   Pulse: 78   Resp: 17   SpO2: 98%     ECOG 0 - Asymptomatic  General: NAD, well appearing  Psych: a&o x3, normal mood and affect  Eyes: EOMI, no scleral icterus  ENMT: neck supple without masses or thyromegaly, mucous membranes moist  MSK: normal gait, normal ROM in bilateral shoulders  Lymph nodes: Well-healing left axillary incision with Dermabond intact. Minimal swelling at the surgical site.  Breast: symmetric, moderate size, grade 2 ptosis  Right: deferred.  Left: Well-healing superior curvilinear incision with Dermabond intact. Minimal swelling at the surgical site.      Assessment:  62 y.o. F with a diagnosis of left breast cancer: Low grade, invasive ductal carcinoma, ER/NM+, Her2 negative. She underwent left partial mastectomy and SLNB on 3/1/22, pT1bN0, anatomic stage IA, prognostic stage IA.      Plan:  -Medical oncology referral. Appointment scheduled with Dr. Brock on 3/25/22.  -Radiation oncology referral. Appointment scheduled with Dr. Finn on 3/28/22.  -Lymphedema clinic evaluation in 3 months.  -F/u in 3 months for clinical exam.  -She was instructed to call sooner with any questions, concerns or changes on BSE.    Fabiola Rich MD      CC:  Eneida Dubois MD

## 2022-03-22 ENCOUNTER — TRANSCRIBE ORDERS (OUTPATIENT)
Dept: SURGERY | Facility: CLINIC | Age: 62
End: 2022-03-22

## 2022-03-22 DIAGNOSIS — Z17.0 MALIGNANT NEOPLASM OF OVERLAPPING SITES OF LEFT BREAST IN FEMALE, ESTROGEN RECEPTOR POSITIVE: Primary | ICD-10-CM

## 2022-03-22 DIAGNOSIS — C50.812 MALIGNANT NEOPLASM OF OVERLAPPING SITES OF LEFT BREAST IN FEMALE, ESTROGEN RECEPTOR POSITIVE: Primary | ICD-10-CM

## 2022-03-25 ENCOUNTER — CONSULT (OUTPATIENT)
Dept: ONCOLOGY | Facility: CLINIC | Age: 62
End: 2022-03-25

## 2022-03-25 ENCOUNTER — LAB (OUTPATIENT)
Dept: LAB | Facility: HOSPITAL | Age: 62
End: 2022-03-25

## 2022-03-25 VITALS
HEART RATE: 64 BPM | RESPIRATION RATE: 16 BRPM | BODY MASS INDEX: 31.14 KG/M2 | HEIGHT: 68 IN | TEMPERATURE: 97.1 F | OXYGEN SATURATION: 99 % | SYSTOLIC BLOOD PRESSURE: 123 MMHG | WEIGHT: 205.5 LBS | DIASTOLIC BLOOD PRESSURE: 79 MMHG

## 2022-03-25 DIAGNOSIS — Z17.0 MALIGNANT NEOPLASM OF OVERLAPPING SITES OF LEFT BREAST IN FEMALE, ESTROGEN RECEPTOR POSITIVE: Primary | ICD-10-CM

## 2022-03-25 DIAGNOSIS — Z17.0 MALIGNANT NEOPLASM OF OVERLAPPING SITES OF LEFT BREAST IN FEMALE, ESTROGEN RECEPTOR POSITIVE: ICD-10-CM

## 2022-03-25 DIAGNOSIS — C50.812 MALIGNANT NEOPLASM OF OVERLAPPING SITES OF LEFT BREAST IN FEMALE, ESTROGEN RECEPTOR POSITIVE: Primary | ICD-10-CM

## 2022-03-25 DIAGNOSIS — C50.812 MALIGNANT NEOPLASM OF OVERLAPPING SITES OF LEFT BREAST IN FEMALE, ESTROGEN RECEPTOR POSITIVE: ICD-10-CM

## 2022-03-25 LAB
ALBUMIN SERPL-MCNC: 4.2 G/DL (ref 3.5–5.2)
ALBUMIN/GLOB SERPL: 1.4 G/DL (ref 1.1–2.4)
ALP SERPL-CCNC: 96 U/L (ref 38–116)
ALT SERPL W P-5'-P-CCNC: 18 U/L (ref 0–33)
ANION GAP SERPL CALCULATED.3IONS-SCNC: 10.1 MMOL/L (ref 5–15)
AST SERPL-CCNC: 20 U/L (ref 0–32)
BASOPHILS # BLD AUTO: 0.04 10*3/MM3 (ref 0–0.2)
BASOPHILS NFR BLD AUTO: 0.6 % (ref 0–1.5)
BILIRUB SERPL-MCNC: 0.2 MG/DL (ref 0.2–1.2)
BUN SERPL-MCNC: 11 MG/DL (ref 6–20)
BUN/CREAT SERPL: 13.9 (ref 7.3–30)
CALCIUM SPEC-SCNC: 9.5 MG/DL (ref 8.5–10.2)
CHLORIDE SERPL-SCNC: 104 MMOL/L (ref 98–107)
CO2 SERPL-SCNC: 26.9 MMOL/L (ref 22–29)
CREAT SERPL-MCNC: 0.79 MG/DL (ref 0.6–1.1)
DEPRECATED RDW RBC AUTO: 47.6 FL (ref 37–54)
EGFRCR SERPLBLD CKD-EPI 2021: 83.6 ML/MIN/1.73
EOSINOPHIL # BLD AUTO: 0.13 10*3/MM3 (ref 0–0.4)
EOSINOPHIL NFR BLD AUTO: 2.1 % (ref 0.3–6.2)
ERYTHROCYTE [DISTWIDTH] IN BLOOD BY AUTOMATED COUNT: 14.3 % (ref 12.3–15.4)
GLOBULIN UR ELPH-MCNC: 3 GM/DL (ref 1.8–3.5)
GLUCOSE SERPL-MCNC: 99 MG/DL (ref 74–124)
HCT VFR BLD AUTO: 37.8 % (ref 34–46.6)
HGB BLD-MCNC: 12.1 G/DL (ref 12–15.9)
IMM GRANULOCYTES # BLD AUTO: 0.01 10*3/MM3 (ref 0–0.05)
IMM GRANULOCYTES NFR BLD AUTO: 0.2 % (ref 0–0.5)
LYMPHOCYTES # BLD AUTO: 2.48 10*3/MM3 (ref 0.7–3.1)
LYMPHOCYTES NFR BLD AUTO: 39.8 % (ref 19.6–45.3)
MCH RBC QN AUTO: 29.2 PG (ref 26.6–33)
MCHC RBC AUTO-ENTMCNC: 32 G/DL (ref 31.5–35.7)
MCV RBC AUTO: 91.1 FL (ref 79–97)
MONOCYTES # BLD AUTO: 0.45 10*3/MM3 (ref 0.1–0.9)
MONOCYTES NFR BLD AUTO: 7.2 % (ref 5–12)
NEUTROPHILS NFR BLD AUTO: 3.12 10*3/MM3 (ref 1.7–7)
NEUTROPHILS NFR BLD AUTO: 50.1 % (ref 42.7–76)
NRBC BLD AUTO-RTO: 0 /100 WBC (ref 0–0.2)
PLATELET # BLD AUTO: 228 10*3/MM3 (ref 140–450)
PMV BLD AUTO: 9 FL (ref 6–12)
POTASSIUM SERPL-SCNC: 4 MMOL/L (ref 3.5–4.7)
PROT SERPL-MCNC: 7.2 G/DL (ref 6.3–8)
RBC # BLD AUTO: 4.15 10*6/MM3 (ref 3.77–5.28)
SODIUM SERPL-SCNC: 141 MMOL/L (ref 134–145)
WBC NRBC COR # BLD: 6.23 10*3/MM3 (ref 3.4–10.8)

## 2022-03-25 PROCEDURE — 99204 OFFICE O/P NEW MOD 45 MIN: CPT | Performed by: INTERNAL MEDICINE

## 2022-03-25 PROCEDURE — 80053 COMPREHEN METABOLIC PANEL: CPT

## 2022-03-25 PROCEDURE — 85025 COMPLETE CBC W/AUTO DIFF WBC: CPT

## 2022-03-25 PROCEDURE — 36415 COLL VENOUS BLD VENIPUNCTURE: CPT

## 2022-03-25 RX ORDER — ANASTROZOLE 1 MG/1
1 TABLET ORAL DAILY
Qty: 30 TABLET | Refills: 5 | Status: SHIPPED | OUTPATIENT
Start: 2022-03-25 | End: 2022-04-24

## 2022-03-25 NOTE — PROGRESS NOTES
Subjective   Cris Martin is a 62 y.o. female.  Referred by Dr. Rich for left breast invasive ductal carcinoma    History of Present Illness    is a 62-year-old postmenopausal  lady who presents with a screen detected abnormality of the right breast.  She does not have any chronic medical illnesses.  11/24/2021-bilateral screening mammogram  Density of the fibroglandular density.  There is a 0.9 cm mass with corresponding architectural distortion in the upper central posterior left breast.  No suspicious masses, calcifications or areas of architectural distortion in the right breast.    12/27/2021-left breast diagnostic mammogram and ultrasound  Mammogram  There is persistence of the area of focal asymmetry in the left breast at 12:00  Ultrasound  At 12:00 lumpectomy from the nipple to the pubic 8 cm irregular hypoechoic mass.  Suspicious    Ultrasound-guided biopsy performed on 1/13/2022  1.left breast mass-invasive ductal carcinoma  No lymphatic invasion  No perineural invasion  ER +99% strong  ME +99% strong  HER-2 negative  Ki-67 10%    Bilateral breast MRI 1/31/2022  In the left breast at 12 o'clock position, 9 cm from the nipple there is a lesion which measures 0.6 x 0.4 x 0.6 cm.  Description of biopsy-proven malignancy.  There is no evidence of anterior internal mammary chain lymphadenopathy  In the middle one third retroareolar region of the right breast there is a 1.6 cm mass which is stable dating back to 2014 and consistent with a fibroadenoma.    2/2/2022-genetic testing-negative    3/1/2022-left breast lumpectomy and sentinel lymph node biopsy  Pathology consistent with invasive ductal carcinoma  Grade 1  Tumor measures 7 mm  Margins negative  Negative for lymphovascular space invasion  Associated low-grade ductal carcinoma in situ measuring 20 mm  2 sentinel lymph nodes negative  ER +99%  ME +99%  HER-2 negative  [id number]    Family history significant for maternal grandmother with  breast cancer in her 90s and maternal great aunt with diagnosis of breast cancer.  No history of ovarian pancreatic cancer or melanoma.    The following portions of the patient's history were reviewed and updated as appropriate: allergies, current medications, past family history, past medical history, past social history, past surgical history and problem list.    Past Medical History:   Diagnosis Date   • Arthritis     OSTEO KNEES   • Basal cell carcinoma     Right breast, ankle, both shoulders, back   • History of COVID-19 2022   • Malignant neoplasm of overlapping sites of left breast in female, estrogen receptor positive (HCC) 2022    INVASIVE DUCTAL   • Sinus pressure     CURRENTLY        Past Surgical History:   Procedure Laterality Date   • BREAST LUMPECTOMY WITH SENTINEL NODE BIOPSY Left 3/1/2022    Procedure: Left needle-localized partial mastectomy and sentinel lymph node biopsy;  Surgeon: Fabiola Rich MD;  Location: [facility];  Service: General;  Laterality: Left;   • COLONOSCOPY     • ORIF FINGER / THUMB FRACTURE Left     CRUSH INJURY   • ORIF WRIST FRACTURE Right     WITH HARDWARE   • SKIN LESION EXCISION      MULTIPLE TIMES MULTIPLE PLACES   • TUBAL ABDOMINAL LIGATION     • WISDOM TOOTH EXTRACTION          Family History   Problem Relation Age of Onset   • Breast cancer Maternal Grandmother 90   • Heart attack Maternal Grandfather    • Heart attack Maternal Aunt    • Diabetes Maternal Aunt    • Alzheimer's disease Father    • Breast cancer Other         Maternal great aunt   • Ovarian cancer Neg Hx    • Uterine cancer Neg Hx    • Colon cancer Neg Hx    • Mental illness Neg Hx         Social History     Socioeconomic History   • Marital status: Single   • Number of children: 2   Tobacco Use   • Smoking status: Former Smoker     Packs/day: 1.00     Years: 20.00     Pack years: 20.00     Types: Cigarettes     Quit date:      Years since quittin.2   • Smokeless tobacco: Never  Used   Vaping Use   • Vaping Use: Never used   Substance and Sexual Activity   • Alcohol use: No   • Drug use: No   • Sexual activity: Not Currently        OB History        4    Para   2    Term   2            AB   2    Living   2       SAB        IAB        Ectopic        Molar        Multiple        Live Births                Age of menarche-10  Age of first live childbirth-20   4 para 2  0  Breast-feeding-none  Age at menopause-48  Oral contraceptive use for 5 years     Allergies   Allergen Reactions   • Penicillins Hives     CHILDHOOD RX   • Codeine Nausea Only            Review of Systems   Constitutional: Negative.    HENT: Negative.    Eyes: Negative.    Respiratory: Negative.    Gastrointestinal: Negative.    Endocrine: Negative.    Genitourinary: Negative.    Musculoskeletal: Negative.    Allergic/Immunologic: Negative.    Hematological: Negative.    Psychiatric/Behavioral: Negative.          Objective   not currently breastfeeding.   Physical Exam  Vitals reviewed.   Constitutional:       Appearance: Normal appearance. She is normal weight.   HENT:      Head: Normocephalic.      Right Ear: External ear normal.      Left Ear: External ear normal.      Nose: Nose normal.   Eyes:      Extraocular Movements: Extraocular movements intact.      Conjunctiva/sclera: Conjunctivae normal.      Pupils: Pupils are equal, round, and reactive to light.   Cardiovascular:      Rate and Rhythm: Normal rate and regular rhythm.      Pulses: Normal pulses.   Pulmonary:      Effort: Pulmonary effort is normal.      Breath sounds: Normal breath sounds.   Abdominal:      General: Abdomen is flat. Bowel sounds are normal.   Musculoskeletal:         General: Normal range of motion.      Cervical back: Normal range of motion.   Skin:     General: Skin is warm.   Neurological:      General: No focal deficit present.      Mental Status: She is alert and oriented to person, place, and time.   Psychiatric:          Mood and Affect: Mood normal.         Behavior: Behavior normal.         Thought Content: Thought content normal.         Judgment: Judgment normal.       Breast Exam: Left breast with incision healing well.  Right breast not examined.    Lab on 03/25/2022   Component Date Value Ref Range Status   • WBC 03/25/2022 6.23  3.40 - 10.80 10*3/mm3 Final   • RBC 03/25/2022 4.15  3.77 - 5.28 10*6/mm3 Final   • Hemoglobin 03/25/2022 12.1  12.0 - 15.9 g/dL Final   • Hematocrit 03/25/2022 37.8  34.0 - 46.6 % Final   • MCV 03/25/2022 91.1  79.0 - 97.0 fL Final   • MCH 03/25/2022 29.2  26.6 - 33.0 pg Final   • MCHC 03/25/2022 32.0  31.5 - 35.7 g/dL Final   • RDW 03/25/2022 14.3  12.3 - 15.4 % Final   • RDW-SD 03/25/2022 47.6  37.0 - 54.0 fl Final   • MPV 03/25/2022 9.0  6.0 - 12.0 fL Final   • Platelets 03/25/2022 228  140 - 450 10*3/mm3 Final   • Neutrophil % 03/25/2022 50.1  42.7 - 76.0 % Final   • Lymphocyte % 03/25/2022 39.8  19.6 - 45.3 % Final   • Monocyte % 03/25/2022 7.2  5.0 - 12.0 % Final   • Eosinophil % 03/25/2022 2.1  0.3 - 6.2 % Final   • Basophil % 03/25/2022 0.6  0.0 - 1.5 % Final   • Immature Grans % 03/25/2022 0.2  0.0 - 0.5 % Final   • Neutrophils, Absolute 03/25/2022 3.12  1.70 - 7.00 10*3/mm3 Final   • Lymphocytes, Absolute 03/25/2022 2.48  0.70 - 3.10 10*3/mm3 Final   • Monocytes, Absolute 03/25/2022 0.45  0.10 - 0.90 10*3/mm3 Final   • Eosinophils, Absolute 03/25/2022 0.13  0.00 - 0.40 10*3/mm3 Final   • Basophils, Absolute 03/25/2022 0.04  0.00 - 0.20 10*3/mm3 Final   • Immature Grans, Absolute 03/25/2022 0.01  0.00 - 0.05 10*3/mm3 Final   • nRBC 03/25/2022 0.0  0.0 - 0.2 /100 WBC Final   Admission on 03/01/2022, Discharged on 03/01/2022   Component Date Value Ref Range Status   • Case Report 03/01/2022    Final                    Value:Surgical Pathology Report                         Case: [id number]                                  Authorizing Provider:  Fabiola Rich MD    Collected:            03/01/2022 10:30 AM          Ordering Location:     [facility]  Received:            03/01/2022 11:05 AM                                 MAIN OR                                                                      Pathologist:           Belkis Mon MD                                                          Specimens:   1) - Breast, Left, Left partial mastectomy- ink marks margins.                                      2) - Breast, Left, Left breast- additional superior/medial- ink marks margins                       3) - Breast, Left, left additional inferior/ medial corner - ink marks margins                      4) - [place] Lymph Node, Left axilla sentinel node #1, hot and blue, count- [id number]                   5) - [place] Lymph Node, Left axilla sentinel node #2, hot and blue, count- [id number]         • Clinical Information 03/01/2022    Final                    Value:This result contains rich text formatting which cannot be displayed here.   • Final Diagnosis 03/01/2022    Final                    Value:This result contains rich text formatting which cannot be displayed here.   • Synoptic Checklist 03/01/2022    Final                    Value:INVASIVE CARCINOMA OF THE BREAST: Resection                            INVASIVE CARCINOMA OF THE BREAST: COMPLETE EXCISION - All Specimens                            8th Edition - Protocol posted: 12/17/2021                                                        SPECIMEN                               Procedure:    Excision (less than total mastectomy)                                Specimen Laterality:    Left                                                         TUMOR                               Tumor Site:    Clock position                                :    12 o'clock                              Histologic Type:    Invasive carcinoma of no special type (ductal)                              Histologic Grade ([facility] Histologic Score):                                    Glandular (Acinar) / Tubular Differentiation:    Score 2                                Nuclear Pleomorphism:    Score 2                                Mitotic Rate:    Score 1                                Overall Grade:    Grade 1 (scores of 3, 4 or 5)                              Tumor Size:    Greatest dimension of largest invasive focus (Millimeters): 7 mm                             Tumor Focality:    Single focus of invasive carcinoma                              Ductal Carcinoma In Situ (DCIS):    Present                                :    Negative for extensive intraductal component (EIC)                                Size (Extent) of DCIS:    Estimated size (extent) of DCIS is at least (Millimeters): 20 mm                               Architectural Patterns:    Cribriform                                Architectural Patterns:    Solid                                Nuclear Grade:    Grade I (low)                                Necrosis:    Not identified                              Lobular Carcinoma In Situ (LCIS):    Not identified                              Lymphovascular Invasion:    Not identified                              Dermal Lymphovascular Invasion:    No skin present                              Microcalcifications:    Present in non-neoplastic tissue                              Treatment Effect in the Breast:    No known presurgical therapy                                                         MARGINS                             Margin Status for Invasive Carcinoma:    All margins negative for invasive carcinoma                                Distance from Invasive Carcinoma to Closest Margin:    10 mm                               Closest Margin(s) to Invasive Carcinoma:    Posterior                                Closest Margin(s) to Invasive Carcinoma:    Lateral                                Distance from Invasive Carcinoma to Anterior Margin:    20  mm                               Distance from Invasive Carcinoma to Posterior Margin:    10 mm                               Distance from Invasive Carcinoma to Superior Margin:    14 mm                               Distance from Invasive Carcinoma to Inferior Margin:    16 mm                               Distance from Invasive Carcinoma to Medial Margin:    15 mm                               Distance from Invasive Carcinoma to Lateral Margin:    10 mm                             Margin Status for DCIS:    All margins negative for DCIS                                Distance from DCIS to Closest Margin:    6 mm                               Closest Margin(s) to DCIS:    Anterior                                Distance from DCIS to Anterior Margin:    6 mm                               Distance from DCIS to Posterior Margin:    10 mm                               Distance from DCIS to Superior Margin:    16 mm                               Distance from DCIS to Inferior Margin:    7 mm                               Distance from DCIS to Medial Margin:    17 mm                               Distance from DCIS to Lateral Margin:    17 mm                                                        REGIONAL LYMPH NODES                             Regional Lymph Node Status:                                   :    All regional lymph nodes negative for tumor                                Total Number of Lymph Nodes Examined (sentinel and non-sentinel):    2                                Number of [place] Nodes Examined:    2                                                         DISTANT METASTASIS                                                        PATHOLOGIC STAGE CLASSIFICATION (pTNM, AJCC 8th Edition)                               Reporting of pT, pN, and (when applicable) pM categories is based on information available to the pathologist at the time the report is issued. As per the AJCC (Chapter 1, 8th Ed.) it is  the managing physician’s responsibility to establish the final pathologic stage based upon all pertinent information, including but potentially not limited to this pathology report.                             pT Category:    pT1b                              Regional Lymph Nodes Modifier:    (sn): [place] node(s) evaluated.                              pN Category:    pN0                                                         SPECIAL STUDIES                               Estrogen Receptor (ER) Status:    Positive (greater than 10% of cells demonstrate nuclear positivity)                                  Percentage of Cells with Nuclear Positivity:    99 %                               Progesterone Receptor (PgR) Status:    Positive                                  Percentage of Cells with Nuclear Positivity:    99 %                               HER2 (by immunohistochemistry):    Negative (Score 1+)                                Ki-67 Percentage of Positive Nuclei:    10 %                               Testing Performed on Case Number:    [id number]      • Gross Description 03/01/2022    Final                    Value:This result contains rich text formatting which cannot be displayed here.   • Special Stains 03/01/2022    Final                    Value:This result contains rich text formatting which cannot be displayed here.   Lab on 02/28/2022   Component Date Value Ref Range Status   • COVID19 02/28/2022 Not Detected  Not Detected - Ref. Range Final        No radiology results for the last 30 days.       Assessment/Plan       *Left breast invasive ductal carcinoma  · pT1b N0 M0  · Grade 1  · ER/WA positive HER-2 negative  · Stage Ia  Discussed at length the details of imaging and pathology report.Discussed the origin of breast cancer from the ducts and the lobules and the histological type of breast cancer based on site of origin. Discussed the tumor size, lymph node status and stage of the cancer. Explained  the presence of DCIS. Discussed the receptor status including ER, [place] and her-2 aide and their significance in determining the biology and treatment. Also discussed the importance of grade and ki-67.   The steps of curative intent breast cancer treatment have been explained, including surgery, possible chemotherapy, possible radiation and possible endocrine therapy.   Since the tumor is relatively small and there is no signs or symptoms suggestive of metastatic disease there is no indication for staging scans.  She has been referred to radiation oncology.  We discussed adjuvant endocrine therapy with aromatase inhibitors.  Adverse effects of AI including but not limited to hot flashes, mood changes, vaginal dryness, sexual dysfunction, insomnia, alopecia, hyperlipidemia, decrease in bone mineral density discussed.  She was somewhat reluctant to start aromatase inhibitor that she believes in holistic medicine.  We discussed about at least trying the medication and if she is unable to tolerate then we can consider either changing to a different medication or continuing off medication.  She is willing to give the medication a try.    I recommend that she start adjuvant endocrine therapy following completion of radiation  We discussed the risk of recurrence and the patterns of recurrence of breast cancer.    *Follow-up-4 months

## 2022-03-28 ENCOUNTER — CONSULT (OUTPATIENT)
Dept: RADIATION ONCOLOGY | Facility: HOSPITAL | Age: 62
End: 2022-03-28

## 2022-03-28 ENCOUNTER — APPOINTMENT (OUTPATIENT)
Dept: RADIATION ONCOLOGY | Facility: HOSPITAL | Age: 62
End: 2022-03-28

## 2022-03-28 ENCOUNTER — PATIENT ROUNDING (BHMG ONLY) (OUTPATIENT)
Dept: ONCOLOGY | Facility: CLINIC | Age: 62
End: 2022-03-28

## 2022-03-28 VITALS — HEART RATE: 70 BPM | SYSTOLIC BLOOD PRESSURE: 125 MMHG | OXYGEN SATURATION: 99 % | DIASTOLIC BLOOD PRESSURE: 81 MMHG

## 2022-03-28 DIAGNOSIS — Z17.0 MALIGNANT NEOPLASM OF OVERLAPPING SITES OF LEFT BREAST IN FEMALE, ESTROGEN RECEPTOR POSITIVE: Primary | ICD-10-CM

## 2022-03-28 DIAGNOSIS — C50.812 MALIGNANT NEOPLASM OF OVERLAPPING SITES OF LEFT BREAST IN FEMALE, ESTROGEN RECEPTOR POSITIVE: Primary | ICD-10-CM

## 2022-03-28 PROCEDURE — 99205 OFFICE O/P NEW HI 60 MIN: CPT | Performed by: RADIOLOGY

## 2022-03-28 PROCEDURE — G0463 HOSPITAL OUTPT CLINIC VISIT: HCPCS | Performed by: RADIOLOGY

## 2022-03-28 NOTE — PROGRESS NOTES
A My-Chart message has been sent to the patient for PATIENT ROUNDING with [facility].

## 2022-03-28 NOTE — PROGRESS NOTES
Subjective     Fabiola Rich MD    Cancer Staging  No matching staging information was found for the patient.    CC: pT1bN0 left breast cancer, ER CT pos Her 2neg                                Dear Fabiola Rich MD    I had the pleasure of seeing Cris Martin  [facility].   The patient is a 62 y.o. female with recently diagnosed left breast cancer.  She had a screening mammogram on 21 which showed an abnormality in left breast.  She had a diagnostic left mammogram on 21 which showed a focal asymmetry middle third left breast at 12 oclock.  Ultrasound confirmed a 8mm hypoechoic mass at 12 oclock 5cm from nipple with posterior shadowing. She had an ultrasound guided biopsy on 22 which revealed grade 1 invasive ductal carcinoma, 5.8mm, with intermediate grade dcis cribriform architecture, ER 99% CT 99% Her 2 neg and ki67 10%.      She had a breast mri on 22 which showed the biopsy proven malignancy left breast 12 oclock middle third with 6mm lesion and no adenopathy or findings in right breast. She underwent a left lumpectomy and sentinel node biopsy on 3/1/22 with pathology revealing a 7mm invasive ductal carcinoma, grade 1, 5mm from the medial margin with associated low grade dcis, solid and cribriform type without necrosis measuring 20mm, with dcis 2mm from the closest inferior margin.  Additional margins all negative.  Two sentinel lymph nodes were negative for metastatic disease.  The final posterior lateral  margin was 10mm from invasive disease and the anterior margin was 6mm from dcis.  Her pathologic stage was pT1bN0.    She met with Dr. Brock on 3/25/22 and she has recommended hormonal blockade.    She is  with menarche age 10, first childbirth age 21 and menopause age 48.  She has a family hx of breast cancer in her maternal grandmother age 90 and maternal great aunt.  She had invitae genetic testing which was negative.         Review of  Systems   Constitutional: Negative.    HENT: Negative.    Respiratory: Negative.    Cardiovascular: Negative.    Gastrointestinal: Negative.    Musculoskeletal: Negative.    Skin: Negative.    Neurological: Negative.    Psychiatric/Behavioral: Negative.          Past Medical History:   Diagnosis Date   • Arthritis     OSTEO KNEES   • Basal cell carcinoma     Right breast, ankle, both shoulders, back   • History of COVID-19 2022   • Malignant neoplasm of overlapping sites of left breast in female, estrogen receptor positive (HCC) 2022    INVASIVE DUCTAL   • Sinus pressure     CURRENTLY         Past Surgical History:   Procedure Laterality Date   • BREAST LUMPECTOMY WITH SENTINEL NODE BIOPSY Left 3/1/2022    Procedure: Left needle-localized partial mastectomy and sentinel lymph node biopsy;  Surgeon: Fabiola Rich MD;  Location[facility];  Service: General;  Laterality: Left;   • COLONOSCOPY     • ORIF FINGER / THUMB FRACTURE Left     CRUSH INJURY   • ORIF WRIST FRACTURE Right     WITH HARDWARE   • SKIN LESION EXCISION      MULTIPLE TIMES MULTIPLE PLACES   • TUBAL ABDOMINAL LIGATION     • WISDOM TOOTH EXTRACTION           Social History     Socioeconomic History   • Marital status: Single   • Number of children: 2   Tobacco Use   • Smoking status: Former Smoker     Packs/day: 1.00     Years: 20.00     Pack years: 20.00     Types: Cigarettes     Quit date:      Years since quittin.2   • Smokeless tobacco: Never Used   Vaping Use   • Vaping Use: Never used   Substance and Sexual Activity   • Alcohol use: No   • Drug use: No   • Sexual activity: Not Currently         Family History   Problem Relation Age of Onset   • Breast cancer Maternal Grandmother 90   • Heart attack Maternal Grandfather    • Heart attack Maternal Aunt    • Diabetes Maternal Aunt    • Alzheimer's disease Father    • Breast cancer Other         Maternal great aunt   • Ovarian cancer Neg Hx    • Uterine cancer Neg Hx    •  Colon cancer Neg Hx    • Mental illness Neg Hx           Objective    Physical Exam  Constitutional:       Appearance: Normal appearance.   HENT:      Head: Atraumatic.   Pulmonary:      Effort: Pulmonary effort is normal.   Chest:          Comments: Well healed incisions, no palpable masses in either breast or axilla, breasts are large in size  Musculoskeletal:         General: Normal range of motion.   Neurological:      General: No focal deficit present.      Mental Status: She is alert and oriented to person, place, and time.   Psychiatric:         Mood and Affect: Mood normal.         Behavior: Behavior normal.         Thought Content: Thought content normal.           Current Outpatient Medications on File Prior to Visit   Medication Sig Dispense Refill   • Acetylcysteine (NAC) 500 MG capsule Take 500 mg by mouth Daily.     • anastrozole (ARIMIDEX) 1 MG tablet Take 1 tablet by mouth Daily for 30 days. Indications: Cancer of the Ovary 30 tablet 5   • Ascorbic Acid (VANDANA-C PO) Take 1,000 mg by mouth Daily.     • coenzyme Q10 50 MG capsule capsule Take 50 mg by mouth Daily.     • fluticasone (FLONASE) 50 MCG/ACT nasal spray 1 spray into the nostril(s) as directed by provider As Needed.     • MELALEUCA SC Take 3 tablets by mouth Daily. REPLENEX     • Quercetin 50 MG tablet Take 50 mg by mouth Daily.     • Stahist AD 25-60 MG tablet Take 0.5 tablets by mouth As Needed.     • Zinc 30 MG capsule Take 30 mg by mouth Daily.       No current facility-administered medications on file prior to visit.       ALLERGIES:    Allergies   Allergen Reactions   • Penicillins Hives     CHILDHOOD RX   • Codeine Nausea Only       There were no vitals taken for this visit.     No flowsheet data found.      Assessment/Plan     63 yo female with pT1bN0 left breast cancer ER ID pos her 2 negative.  I discussed with her my recommendation for post-operative radiation therapy to the left breast to decrease the risk of local recurrence.       I discussed with her in detail the risks, benefits and rationale of radiation therapy to the left breast to include but not limited to the following:    Acute: skin erythema, breakdown/moist desquamation, swelling or discomfort of the breast, fatigue, pneumonitis resulting in shortness of breath, cough or pain    Late: Permanent skin changes including hyperpigmentation, telangiectasias, fibrosis of the breast resulting in smaller size or poor cosmetic outcome, late edema or cellulitis, late rib fracture, late pulmonary fibrosis and the remote risk of late cardiac damage resulting in increased risk of heart attack or second malignancies.      She voiced understanding and was given an opportunity to ask questions which I believe were answered to her satisfaction.  She is not sure she wants to proceed with radiation and would like to think about it over the next few weeks.  I have scheduled her to return in 2-3 weeks for re-evalaution and treatment planning but she knows she can cancel this appointment if she decides not to proceed. I plan to treat the left breast with tangential fields to a dose of approximately 3990cGy in 15 fractions with no boost.     I personally spent greater than 60 minutes today assessing, managing, discussing and documenting my visit with the patient. That time includes review of records, imaging and pathology reports, obtaining my own history, performing a medically appropriate evaluation, counseling and educating the patient, discussing goals, logistics, alternatives and risks of my recommendations, surveillance options and potential outcomes. It also includes the time documenting the clinical information in the EMR and communicating my recommendations to the other involved physicians.                 Thank you very much for allowing me to participate in the care of this very pleasant patient.    Sincerely,      Faustina Finn MD

## 2022-04-01 ENCOUNTER — HOSPITAL ENCOUNTER (OUTPATIENT)
Dept: PHYSICAL THERAPY | Facility: HOSPITAL | Age: 62
Setting detail: THERAPIES SERIES
Discharge: HOME | End: 2022-04-01

## 2022-04-01 DIAGNOSIS — Z91.89 AT RISK FOR LYMPHEDEMA: ICD-10-CM

## 2022-04-01 DIAGNOSIS — C50.812 MALIGNANT NEOPLASM OF OVERLAPPING SITES OF LEFT BREAST IN FEMALE, ESTROGEN RECEPTOR POSITIVE: Primary | ICD-10-CM

## 2022-04-01 DIAGNOSIS — Z17.0 MALIGNANT NEOPLASM OF OVERLAPPING SITES OF LEFT BREAST IN FEMALE, ESTROGEN RECEPTOR POSITIVE: Primary | ICD-10-CM

## 2022-04-01 DIAGNOSIS — Z90.12 S/P LEFT MASTECTOMY: ICD-10-CM

## 2022-04-01 PROCEDURE — 93702 BIS XTRACELL FLUID ANALYSIS: CPT

## 2022-04-01 PROCEDURE — 97161 PT EVAL LOW COMPLEX 20 MIN: CPT

## 2022-04-01 PROCEDURE — 97535 SELF CARE MNGMENT TRAINING: CPT

## 2022-04-01 NOTE — THERAPY EVALUATION
Physical Therapy Lymphedema Initial Evaluation  [facility]     Patient Name: Cris Martin  : 1960  MRN: [id number]  Today's Date: 2022      Visit Date: 2022    Visit Dx:    ICD-10-CM ICD-9-CM   1. Malignant neoplasm of overlapping sites of left breast in female, estrogen receptor positive (HCC)  C50.812 174.8    Z17.0 V86.0   2. S/P left mastectomy  Z90.12 V45.71   3. At risk for lymphedema  Z91.89 V49.89       Patient Active Problem List   Diagnosis   • Skin lesion of left leg   • Malignant neoplasm of overlapping sites of left breast in female, estrogen receptor positive (HCC)        Past Medical History:   Diagnosis Date   • Arthritis     OSTEO KNEES   • Basal cell carcinoma     Right breast, ankle, both shoulders, back   • History of COVID-19 2022   • Malignant neoplasm of overlapping sites of left breast in female, estrogen receptor positive (HCC) 2022    INVASIVE DUCTAL   • Sinus pressure     CURRENTLY        Past Surgical History:   Procedure Laterality Date   • BREAST LUMPECTOMY WITH SENTINEL NODE BIOPSY Left 3/1/2022    Procedure: Left needle-localized partial mastectomy and sentinel lymph node biopsy;  Surgeon: Fabiola Rich MD;  Location: [facility] OR;  Service: General;  Laterality: Left;   • COLONOSCOPY     • ORIF FINGER / THUMB FRACTURE Left     CRUSH INJURY   • ORIF WRIST FRACTURE Right     WITH HARDWARE   • SKIN LESION EXCISION      MULTIPLE TIMES MULTIPLE PLACES   • TUBAL ABDOMINAL LIGATION     • WISDOM TOOTH EXTRACTION         Visit Dx:    ICD-10-CM ICD-9-CM   1. Malignant neoplasm of overlapping sites of left breast in female, estrogen receptor positive (HCC)  C50.812 174.8    Z17.0 V86.0   2. S/P left mastectomy  Z90.12 V45.71   3. At risk for lymphedema  Z91.89 V49.89        Patient History     Row Name 22 1000             History    Chief Complaint --  none; pt states she is doing well  -LB      Date Current Problem(s) Began 22  -LB       Brief Description of Current Complaint Pt s/p L lumpectomy SLNB 3/1/22. She had 2 LNs removed and both were negative. Surgeon Dr. Rich. She does not require chemo and is determining whether or not she needs radiation. She denies issues and feels she is doing excellent post-operatively and does not need PT. She states she is working on getting healthier and is reducing sugar in her diet. She is R handed but states she does more with her L hand around her home.  -LB      Previous treatment for THIS PROBLEM Surgery  -LB      Surgery Date: 03/01/22  -LB      Patient/Caregiver Goals Return to prior level of function  -LB      Hand Dominance right-handed  -LB      Occupation/sports/leisure activities Pt does not work.  -LB      Patient seeing anyone else for problem(s)? yes  -LB      How has patient tried to help current problem? using LUE  -LB      History of Previous Related Injuries none  -LB              Pain     Pain at Present 0  -LB      Pain at Best 0  -LB      Pain at Worst 0  -LB      Is your sleep disturbed? No  -LB              Fall Risk Assessment    Any falls in the past year: No  -LB              Services    Prior Rehab/Home Health Experiences No  -LB      Are you currently receiving Home Health services No  -LB      Do you plan to receive Home Health services in the near future No  -LB              Daily Activities    Primary Language English  -LB      Pt Participated in POC and Goals Yes  -LB              Safety    Are you being hurt, hit, or frightened by anyone at home or in your life? No  -LB      Are you being neglected by a caregiver No  -LB      Have you had any of the following issues with N/A  -LB            User Key  (r) = Recorded By, (t) = Taken By, (c) = Cosigned By    Initials Name Provider Type    Millicent Perez, PT Physical Therapist                 Lymphedema     Row Name 04/01/22 1300             Subjective Pain    Able to rate subjective pain? yes  -LB      Pre-Treatment Pain Level 0   -LB              Subjective Comments    Subjective Comments I am doing really well post-operatively.  -LB              Lymphedema Assessment    Lymphedema Classification LUE:;at risk/stage 0  -LB      Lymphedema Cancer Related Sx left;lumpectomy;sentinel node biopsy  -LB      Lymph Nodes Removed # 2  -LB      Positive Lymph Nodes # 0  -LB      Chemo Received no  -LB      Radiation Therapy Received no  pt stating it is her choice and she is leaning towards not pursuing  -LB      Infections or Cellulitis? no  -LB              Posture/Observations    Posture/Observations Comments WNL  -LB              General ROM    GENERAL ROM COMMENTS B AROM WFL  -LB              MMT (Manual Muscle Testing)    General MMT Comments BUE 4/5  -LB              Lymphedema Edema Assessment    Edema Assessment Comment no obvious edema  -LB              Skin Changes/Observations    Skin Observations Comment well healing incisions  -LB              Lymphedema Sensation    Lymphedema Sensation Comments normal  -LB              Lymphedema Pulses/Capillary Refill    Lymph Pulses Capillary Refill Comments normal  -LB              Compression/Skin Care    Compression/Skin Care Comments discussed need for compression for radiation  -LB              L-Dex Bioimpedence Screening    L-Dex Measurement Extremity LUE  -LB      L-Dex Patient Position Standing  -LB      L-Dex UE Dominate Side Right  -LB      L-Dex UE At Risk Side Left  -LB      L-Dex UE Pre Surgical Value No  -LB      L-Dex UE Score 2  -LB      L-Dex UE Baseline Score 2  -LB      L-Dex UE Value Change 0  -LB      L-Dex UE Comment WNL  -LB      $ L-Dex Charge yes  -LB            User Key  (r) = Recorded By, (t) = Taken By, (c) = Cosigned By    Initials Name Provider Type    Millicent Perez, PT Physical Therapist                                Therapy Education  Education Details: discussed s/s of lymphedema, compression garment use and wear schedule if radiation is scheduled, bioimpedance  results and interpretation, lymphedema prevention, radiation skin care, slow return/build up to weight lifting  Given: HEP, Symptoms/condition management, Mobility training, Posture/body mechanics, Edema management  Program: New  How Provided: Verbal, Demonstration, Written  Provided to: Patient  Level of Understanding: Verbalized, Teach back education performed, Demonstrated  47949 - PT Self Care/Mgmt Minutes: 15       OP Exercises     Row Name 04/01/22 1300             Subjective Comments    Subjective Comments I am doing really well post-operatively.  -LB              Subjective Pain    Able to rate subjective pain? yes  -LB      Pre-Treatment Pain Level 0  -LB            User Key  (r) = Recorded By, (t) = Taken By, (c) = Cosigned By    Initials Name Provider Type    LB Millicent Foley, PT Physical Therapist                             PT OP Goals     Row Name 04/01/22 1300          Long Term Goals    LTG Date to Achieve 05/01/22  -LB     LTG 1 Pt will maintain LDex bioimpedance score WNL.  -LB     LTG 1 Progress New  -LB     LTG 2 Pt will acquire compression garment if radiation is needed.  -LB     LTG 2 Progress New  -LB            Time Calculation    PT Goal Re-Cert Due Date 06/30/22  -LB           User Key  (r) = Recorded By, (t) = Taken By, (c) = Cosigned By    Initials Name Provider Type    Millicent Perez, PT Physical Therapist                 PT Assessment/Plan     Row Name 04/01/22 1331          PT Assessment    Functional Limitations Other (comment)  at risk for lymphedema  -LB     Impairments Impaired flexibility;Impaired lymphatic circulation  -LB     Please refer to paper survey for additional self-reported information Yes  -LB     Rehab Potential Good  -LB     Patient/caregiver participated in establishment of treatment plan and goals Yes  -LB     Patient would benefit from skilled therapy intervention Yes  -LB            PT Plan    PT Frequency Other (comment)  -LB     Predicted Duration of Therapy  Intervention (PT) repeat bioimpedance in 3 months  -LB     Planned CPT's? PT RE-EVAL: 71837;PT THER PROC EA 15 MIN: 37159;PT THER ACT EA 15 MIN: 89995;PT EVAL LOW COMPLEXITY: 77102;PT NEUROMUSC RE-EDUCATION EA 15 MIN: 58095;PT MANUAL THERAPY EA 15 MIN: 17176;PT SELF CARE/HOME MGMT/TRAIN EA 15: 04528;PT BIS XTRACELL FLUID ANALYSIS: 49423  -LB     PT Plan Comments repeat bioimpedance in 3 months, address concerns at that time unless symptoms present sooner  -LB           User Key  (r) = Recorded By, (t) = Taken By, (c) = Cosigned By    Initials Name Provider Type    Millicent Perez PT Physical Therapist                   Outcome Measure Options: Quick DASH  Quick DASH  Open a tight or new jar.: Mild Difficulty  Do heavy household chores (e.g., wash walls, wash floors): Mild Difficulty  Carry a shopping bag or briefcase: No Difficulty  Wash your back: No Difficulty  Use a knife to cut food: No Difficulty  Recreational activities in which you take some force or impact through your arm, should or hand (e.g. golf, hammering, tennis, etc.): No Difficulty  During the past week, to what extent has your arm, shoulder, or hand problem interfered with your normal social activites with family, friends, neighbors or groups?: Not at all  During the past week, were you limited in your work or other regular daily activities as a result of your arm, shoulder or hand problem?: Not limited at all  Arm, Shoulder, or hand pain: None  Tingling (pins and needles) in your arm, shoulder, or hand: None  During the past week, how much difficulty have you had sleeping because of the pain in your arm, shoulder or hand?: No difficulty  Number of Questions Answered: 11  Quick DASH Score: 4.55  Quick Dash Comments: 5% disability         Time Calculation:   Start Time: 0915  Stop Time: 1000  Time Calculation (min): 45 min  Total Timed Code Minutes- PT: 15 minute(s)  Timed Charges  85767 - PT Self Care/Mgmt Minutes: 15  Total Minutes  Timed Charges  Total Minutes: 15   Total Minutes: 15   Therapy Charges for Today     Code Description Service Date Service Provider Modifiers Qty    74260308573 HC PT BIS XTRACELL FLUID ANALYSIS 4/1/2022 Millicent Foley, PT  1    55947588814 HC PT SELF CARE/MGMT/TRAIN EA 15 MIN 4/1/2022 Millicent Foley, PT GP 1    78339575533 HC PT EVAL LOW COMPLEXITY 1 4/1/2022 Millicent Foley, PT GP 1          PT G-Codes  Outcome Measure Options: Quick DASH  Quick DASH Score: 4.55         Millicent Foley, PT  4/1/2022

## 2022-06-16 ENCOUNTER — TELEPHONE (OUTPATIENT)
Dept: ONCOLOGY | Facility: CLINIC | Age: 62
End: 2022-06-16

## 2022-06-16 NOTE — TELEPHONE ENCOUNTER
Caller: Cris Martin    Relationship to patient: Self    Best call back number: [phone]    Type of visit: LAB AND FOLLOW UP    Requested date: 3 MONTHS OUT    If rescheduling, when is the original appointment: 06/17    Additional notes: PLEASE CALL ONCE R/S. HUB UNABLE TO R/S WITHIN TIMEFRAME.

## 2022-06-17 ENCOUNTER — APPOINTMENT (OUTPATIENT)
Dept: LAB | Facility: HOSPITAL | Age: 62
End: 2022-06-17

## 2022-07-15 ENCOUNTER — TELEPHONE (OUTPATIENT)
Dept: SURGERY | Facility: CLINIC | Age: 62
End: 2022-07-15

## 2022-07-15 NOTE — TELEPHONE ENCOUNTER
I called patient regarding follow up appointment 7/20/22 @ 10:30 am which was canceled VIA Interface.     I left VM for patient to call back to reschedule appointment.

## 2022-07-22 ENCOUNTER — HOSPITAL ENCOUNTER (OUTPATIENT)
Dept: PHYSICAL THERAPY | Facility: HOSPITAL | Age: 62
Setting detail: THERAPIES SERIES
Discharge: HOME | End: 2022-07-22

## 2022-07-22 DIAGNOSIS — Z17.0 MALIGNANT NEOPLASM OF OVERLAPPING SITES OF LEFT BREAST IN FEMALE, ESTROGEN RECEPTOR POSITIVE: Primary | ICD-10-CM

## 2022-07-22 DIAGNOSIS — C50.812 MALIGNANT NEOPLASM OF OVERLAPPING SITES OF LEFT BREAST IN FEMALE, ESTROGEN RECEPTOR POSITIVE: Primary | ICD-10-CM

## 2022-07-22 DIAGNOSIS — Z90.12 S/P LEFT MASTECTOMY: ICD-10-CM

## 2022-07-22 DIAGNOSIS — Z91.89 AT RISK FOR LYMPHEDEMA: ICD-10-CM

## 2022-07-22 PROCEDURE — 93702 BIS XTRACELL FLUID ANALYSIS: CPT

## 2022-07-22 NOTE — THERAPY RE-EVALUATION
Physical Therapy Lymphedema Re-Evaluation  [facility]     Patient Name: Cris Martin  : 1960  MRN: [id number]  Today's Date: 2022      Visit Date: 2022    Visit Dx:    ICD-10-CM ICD-9-CM   1. Malignant neoplasm of overlapping sites of left breast in female, estrogen receptor positive (HCC)  C50.812 174.8    Z17.0 V86.0   2. S/P left mastectomy  Z90.12 V45.71   3. At risk for lymphedema  Z91.89 V49.89       Patient Active Problem List   Diagnosis   • Skin lesion of left leg   • Malignant neoplasm of overlapping sites of left breast in female, estrogen receptor positive (HCC)        Past Medical History:   Diagnosis Date   • Arthritis     OSTEO KNEES   • Basal cell carcinoma     Right breast, ankle, both shoulders, back   • History of COVID-19 2022   • Malignant neoplasm of overlapping sites of left breast in female, estrogen receptor positive (HCC) 2022    INVASIVE DUCTAL   • Sinus pressure     CURRENTLY        Past Surgical History:   Procedure Laterality Date   • BREAST LUMPECTOMY WITH SENTINEL NODE BIOPSY Left 3/1/2022    Procedure: Left needle-localized partial mastectomy and sentinel lymph node biopsy;  Surgeon: Fabiola Rich MD;  Location: [facility];  Service: General;  Laterality: Left;   • COLONOSCOPY     • ORIF FINGER / THUMB FRACTURE Left     CRUSH INJURY   • ORIF WRIST FRACTURE Right     WITH HARDWARE   • SKIN LESION EXCISION      MULTIPLE TIMES MULTIPLE PLACES   • TUBAL ABDOMINAL LIGATION     • WISDOM TOOTH EXTRACTION         Visit Dx:    ICD-10-CM ICD-9-CM   1. Malignant neoplasm of overlapping sites of left breast in female, estrogen receptor positive (HCC)  C50.812 174.8    Z17.0 V86.0   2. S/P left mastectomy  Z90.12 V45.71   3. At risk for lymphedema  Z91.89 V49.89            Lymphedema     Row Name 22 1300             Subjective Pain    Able to rate subjective pain? yes  -LB      Pre-Treatment Pain Level 0  -LB              Subjective Comments     Subjective Comments I am doing fine. I have been [place] back and forth to [place] to care for my mom. It is 9 hours. Otherwise no complaints.  -LB              Lymphedema Assessment    Lymphedema Classification LUE:;at risk/stage 0  -LB      Lymphedema Cancer Related Sx left;lumpectomy;sentinel node biopsy  -LB      Lymph Nodes Removed # 2  -LB      Positive Lymph Nodes # 0  -LB      Chemo Received no  -LB      Radiation Therapy Received no  -LB      Infections or Cellulitis? no  -LB              Posture/Observations    Posture/Observations Comments WNL  -LB              L-Dex Bioimpedence Screening    L-Dex Measurement Extremity LUE  -LB      L-Dex Patient Position Standing  -LB      L-Dex UE Dominate Side Right  -LB      L-Dex UE At Risk Side Left  -LB      L-Dex UE Pre Surgical Value No  -LB      L-Dex UE Score 3.1  -LB      L-Dex UE Baseline Score 2  -LB      L-Dex UE Value Change 1.1  -LB      L-Dex UE Comment WNL  -LB      $ L-Dex Charge yes  -LB            User Key  (r) = Recorded By, (t) = Taken By, (c) = Cosigned By    Initials Name Provider Type    Millicent Perez, MENDOZA Physical Therapist                                Therapy Education  Education Details: reviewed s/s of lympehdema, bioimpedance results and wear schedule  Given: Symptoms/condition management, Edema management  Program: Reinforced  How Provided: Verbal  Provided to: Patient  Level of Understanding: Verbalized       OP Exercises     Row Name 07/22/22 1300             Subjective Comments    Subjective Comments I am doing fine. I have been driving back and forth to [facility] to care for my mom. It is 9 hours. Otherwise no complaints.  -LB              Subjective Pain    Able to rate subjective pain? yes  -LB      Pre-Treatment Pain Level 0  -LB            User Key  (r) = Recorded By, (t) = Taken By, (c) = Cosigned By    Initials Name Provider Type    Millicent Perez PT Physical Therapist                             PT OP Goals     Row Name 07/22/22  1300          Long Term Goals    LTG Date to Achieve 05/01/22  -LB     LTG 1 Pt will maintain LDex bioimpedance score WNL.  -LB     LTG 1 Progress Ongoing  -LB     LTG 1 Progress Comments WNL today at 3.1; stable for pt  -LB     LTG 2 Pt will acquire compression garment if radiation is needed.  -LB     LTG 2 Progress Met  -LB     LTG 2 Progress Comments Pt did not have radiation.  -LB           User Key  (r) = Recorded By, (t) = Taken By, (c) = Cosigned By    Initials Name Provider Type    LB Millicent Foley, PT Physical Therapist                 PT Assessment/Plan     Row Name 07/22/22 1332          PT Assessment    Functional Limitations Other (comment)  at risk for lymphedema  -LB     Impairments Impaired flexibility;Impaired lymphatic circulation  -LB     Assessment Comments Cris Martin is a 62 y.o. female who returns for 3 month f/u biompedance spectroscopy diagnostic assessment of lymphatics of the upper extremities due to increased risk of post lumpectomy lymphedema Left upper extremity due to history of s/p Left Lumpectomy with [place] Node Biopsy performed on 3/1/22 by surgeons Dr. Rich. Cris Martin   is at increased risk of post lumpectomy lymphedema syndrome Left Upper Extremity due to Breast surgery Lymph Node Removal. Current L-Dex score is 3.1, which is WNL. Due to good progress, patient is to return in 6 months to reassess bioimpedance scoring unless otherwise indicated. Cris Martin  was encouraged to contact me during this time with any questions, concerns or changes in symptoms.  -LB     Rehab Potential Good  -LB     Patient/caregiver participated in establishment of treatment plan and goals Yes  -LB     Patient would benefit from skilled therapy intervention Yes  -LB            PT Plan    PT Frequency Other (comment)  -LB     Predicted Duration of Therapy Intervention (PT) repeat bioimpedance in 6 months  -LB     Planned CPT's? PT RE-EVAL: 03103;PT SELF CARE/HOME MGMT/TRAIN EA 15: 99350;PT  BIS XTRACELL FLUID ANALYSIS: 43041  -LB     PT Plan Comments repeat bioimpedance in 6 months  -LB           User Key  (r) = Recorded By, (t) = Taken By, (c) = Cosigned By    Initials Name Provider Type    Millicent Perez, PT Physical Therapist                             Time Calculation:   Start Time: 1315  Stop Time: 1330  Time Calculation (min): 15 min   Therapy Charges for Today     Code Description Service Date Service Provider Modifiers Qty    02162826295  PT BIS XTRACELL FLUID ANALYSIS 7/22/2022 Millicent Foley, PT  1                    Millicent Foley, PT  7/22/2022

## 2022-09-09 ENCOUNTER — APPOINTMENT (OUTPATIENT)
Dept: LAB | Facility: HOSPITAL | Age: 62
End: 2022-09-09

## 2022-09-20 ENCOUNTER — OFFICE VISIT (OUTPATIENT)
Dept: OBSTETRICS AND GYNECOLOGY | Age: 62
End: 2022-09-20

## 2022-09-20 VITALS
WEIGHT: 186 LBS | DIASTOLIC BLOOD PRESSURE: 78 MMHG | SYSTOLIC BLOOD PRESSURE: 122 MMHG | BODY MASS INDEX: 28.19 KG/M2 | HEIGHT: 68 IN

## 2022-09-20 DIAGNOSIS — Z00.00 ENCOUNTER FOR HEALTH MAINTENANCE EXAMINATION: ICD-10-CM

## 2022-09-20 DIAGNOSIS — Z11.51 SCREENING FOR HUMAN PAPILLOMAVIRUS: Primary | ICD-10-CM

## 2022-09-20 DIAGNOSIS — Z12.11 SCREENING FOR COLON CANCER: ICD-10-CM

## 2022-09-20 DIAGNOSIS — Z91.89 AT HIGH RISK FOR BREAST CANCER: ICD-10-CM

## 2022-09-20 DIAGNOSIS — E78.00 HYPERCHOLESTEREMIA: ICD-10-CM

## 2022-09-20 PROCEDURE — 99396 PREV VISIT EST AGE 40-64: CPT | Performed by: OBSTETRICS & GYNECOLOGY

## 2022-09-20 NOTE — PROGRESS NOTES
Routine Annual Visit    2022    Patient: Cris Martin          MR#:[id number]      Chief Complaint   Patient presents with   • Gynecologic Exam     AE Today, Last AE 2021, Last pap 2019 (-), HPV (-), MG 2020, Colonoscopy out of date       History of Present Illness    62 y.o. female  who presents for annual exam. She was diagnosed with Ia ER/RI+ Her-2 neg invasive ductal breast cancer this year and had a lumpectomy, SNL bx.   She met with oncology and they recommended radiation therapy, she does not want to do these. She is interested in a holistic approach. She is generally distrustful of western medicine.   Has been eating healthily, losing weight, exercising for ca recurrence prevention.  Discussed she is at risk of recurrence and given famhx possibly at risk of new primary breast ca and recommended at least high risk breast cancer screening and she is agreeable.      No LMP recorded. Patient is postmenopausal.  Obstetric History:  OB History        4    Para   2    Term   2            AB   2    Living   2       SAB        IAB        Ectopic        Molar        Multiple        Live Births                   Menstrual History:     No LMP recorded. Patient is postmenopausal.       ________________________________________  Patient Active Problem List   Diagnosis   • Skin lesion of left leg   • Malignant neoplasm of overlapping sites of left breast in female, estrogen receptor positive (HCC)       Past Medical History:   Diagnosis Date   • Arthritis     OSTEO KNEES   • Basal cell carcinoma     Right breast, ankle, both shoulders, back   • History of COVID-19 2022   • Malignant neoplasm of overlapping sites of left breast in female, estrogen receptor positive (HCC) 2022    INVASIVE DUCTAL   • Sinus pressure     CURRENTLY       Family History   Problem Relation Age of Onset   • Breast cancer Maternal Grandmother 90   • Heart attack Maternal Grandfather    • Heart attack  "Maternal Aunt    • Diabetes Maternal Aunt    • Alzheimer's disease Father    • Breast cancer Other         Maternal great aunt   • Ovarian cancer Neg Hx    • Uterine cancer Neg Hx    • Colon cancer Neg Hx    • Mental illness Neg Hx        Past Surgical History:   Procedure Laterality Date   • BREAST LUMPECTOMY WITH SENTINEL NODE BIOPSY Left 3/1/2022    Procedure: Left needle-localized partial mastectomy and sentinel lymph node biopsy;  Surgeon: Fabiola Rich MD;  Location: [facility];  Service: General;  Laterality: Left;   • COLONOSCOPY     • ORIF FINGER / THUMB FRACTURE Left     CRUSH INJURY   • ORIF WRIST FRACTURE Right     WITH HARDWARE   • SKIN LESION EXCISION      MULTIPLE TIMES MULTIPLE PLACES   • TUBAL ABDOMINAL LIGATION     • WISDOM TOOTH EXTRACTION         Social History     Tobacco Use   Smoking Status Former Smoker   • Packs/day: 1.00   • Years: 20.00   • Pack years: 20.00   • Types: Cigarettes   • Quit date:    • Years since quittin.7   Smokeless Tobacco Never Used       has a current medication list which includes the following prescription(s): vitamin d3.  ________________________________________      Review of Systems   Constitutional: Negative for fever and unexpected weight change.   Respiratory: Negative for shortness of breath.    Cardiovascular: Negative for chest pain.   Gastrointestinal: Negative for abdominal pain, constipation and diarrhea.   Genitourinary: Negative for frequency and urgency.   Hematological: Negative for adenopathy.   Psychiatric/Behavioral: Negative for dysphoric mood.       Objective   Physical Exam    /78   Ht 172.7 cm (68\")   Wt 84.4 kg (186 lb)   Breastfeeding No   BMI 28.28 kg/m²    BP Readings from Last 3 Encounters:   22 122/78   22 125/81   22 123/79      Wt Readings from Last 3 Encounters:   22 84.4 kg (186 lb)   22 93.2 kg (205 lb 8 oz)   22 93 kg (205 lb)         BMI: Body mass index is 28.28 " kg/m².       General:   alert, appears stated age and cooperative   Neck: No thyromegaly or LAD   Heart:: regular rate and rhythm, S1, S2 normal, no murmur, click, rub or gallop   Lungs: normal respiratory effort and auscultation   Abdomen: soft, non-tender, without masses or organomegaly   Breast: inspection negative, no nipple discharge or bleeding, no masses or nodularity palpable and lumpx scar of left breast, well healed   Urethra and bladder: urethral meatus normal; bladder nontender to palpation;   Vulva: normal, Bartholin's, Urethra, [place]'s normal   Vagina: normal but atrophic mucosa   Cervix: multiparous appearance and no lesions   Uterus: normal size, mobile and non-tender   Adnexa: normal adnexa and no mass, fullness, tenderness       Assessment:    normal annual exam   Stage 1 breast ca s/p lumpectomy  Menopause  famhx breast cancer  At high risk of breast cancer    Plan:    Plan     [x]  Mammogram request made  [x]  PAP done  []  Labs:   []  GC/Chl/TV  []  DEXA scan   [x]  Referral for colonoscopy:     She declines any adjuvant therapy for stage I breast cancer.  I discussed with her today the rationale for the adjuvant treatment which she has declined. She is still not interested.  We discussed lifestyle changes to decrease her risk of recurrence as well such as weight loss, healthy diet, abstaining from alcohol.  Plan for high risk breast cancer screening.  Plan mammogram coming up soon, then MRI in the spring.  Every 6-month clinical breast exam as well.    Diagnoses and all orders for this visit:    1. Screening for human papillomavirus (Primary)  -     IGP, Apt HPV,rfx 16 / 18,45    2. At high risk for breast cancer  -     Mammo Screening Digital Tomosynthesis Bilateral With CAD; Future    3. Screening for colon cancer  -     Ambulatory Referral For Screening Colonoscopy    4. Encounter for health maintenance examination  -     CBC (No Diff)  -     Comprehensive Metabolic Panel  -     Vitamin D 25  Hydroxy  -     TSH Rfx On Abnormal To Free T4  -     Hemoglobin A1c  -     IGP, Apt HPV,rfx 16 / 18,45    5. Hypercholesteremia  -     Lipid Panel          Counseling  [x]  Nutrition  [x]  Physical activity/regular exercise   [x]  Healthy weight  []  Injury prevention  []  Smoking cessation  []  Substance misuse/abuse  [x]  Sexual behavior  []  STD prevention  []  Contraception  []  Dental health  []  Mental health  []  Immunization  [x]  Encouraged SBE      Eneida Dubois MD  09/20/2022  16:17 EDT

## 2022-09-21 LAB
25(OH)D3+25(OH)D2 SERPL-MCNC: 101 NG/ML (ref 30–100)
ALBUMIN SERPL-MCNC: 4.5 G/DL (ref 3.8–4.8)
ALBUMIN/GLOB SERPL: 1.9 {RATIO} (ref 1.2–2.2)
ALP SERPL-CCNC: 94 IU/L (ref 44–121)
ALT SERPL-CCNC: 15 IU/L (ref 0–32)
AST SERPL-CCNC: 18 IU/L (ref 0–40)
BILIRUB SERPL-MCNC: <0.2 MG/DL (ref 0–1.2)
BUN SERPL-MCNC: 13 MG/DL (ref 8–27)
BUN/CREAT SERPL: 16 (ref 12–28)
CALCIUM SERPL-MCNC: 9.4 MG/DL (ref 8.7–10.3)
CHLORIDE SERPL-SCNC: 102 MMOL/L (ref 96–106)
CO2 SERPL-SCNC: 24 MMOL/L (ref 20–29)
CREAT SERPL-MCNC: 0.81 MG/DL (ref 0.57–1)
EGFRCR SERPLBLD CKD-EPI 2021: 81 ML/MIN/1.73
ERYTHROCYTE [DISTWIDTH] IN BLOOD BY AUTOMATED COUNT: 13 % (ref 11.7–15.4)
GLOBULIN SER CALC-MCNC: 2.4 G/DL (ref 1.5–4.5)
GLUCOSE SERPL-MCNC: 91 MG/DL (ref 65–99)
HBA1C MFR BLD: 6 % (ref 4.8–5.6)
HCT VFR BLD AUTO: 38 % (ref 34–46.6)
HGB BLD-MCNC: 12.6 G/DL (ref 11.1–15.9)
MCH RBC QN AUTO: 29.4 PG (ref 26.6–33)
MCHC RBC AUTO-ENTMCNC: 33.2 G/DL (ref 31.5–35.7)
MCV RBC AUTO: 89 FL (ref 79–97)
PLATELET # BLD AUTO: 250 X10E3/UL (ref 150–450)
POTASSIUM SERPL-SCNC: 4.3 MMOL/L (ref 3.5–5.2)
PROT SERPL-MCNC: 6.9 G/DL (ref 6–8.5)
RBC # BLD AUTO: 4.28 X10E6/UL (ref 3.77–5.28)
SODIUM SERPL-SCNC: 143 MMOL/L (ref 134–144)
T4 FREE SERPL-MCNC: 0.8 NG/DL (ref 0.82–1.77)
TSH SERPL DL<=0.005 MIU/L-ACNC: 8.22 UIU/ML (ref 0.45–4.5)
WBC # BLD AUTO: 6.3 X10E3/UL (ref 3.4–10.8)

## 2022-09-26 LAB
CYTOLOGIST CVX/VAG CYTO: NORMAL
CYTOLOGY CVX/VAG DOC CYTO: NORMAL
CYTOLOGY CVX/VAG DOC THIN PREP: NORMAL
DX ICD CODE: NORMAL
HIV 1 & 2 AB SER-IMP: NORMAL
HPV I/H RISK 4 DNA CVX QL PROBE+SIG AMP: NEGATIVE
OTHER STN SPEC: NORMAL
STAT OF ADQ CVX/VAG CYTO-IMP: NORMAL

## 2022-09-27 DIAGNOSIS — R79.89 ELEVATED TSH: Primary | ICD-10-CM

## 2023-12-09 ENCOUNTER — HOSPITAL ENCOUNTER (EMERGENCY)
Facility: HOSPITAL | Age: 63
Discharge: HOME | End: 2023-12-09
Attending: EMERGENCY MEDICINE
Payer: MEDICARE

## 2023-12-09 ENCOUNTER — APPOINTMENT (OUTPATIENT)
Dept: CT IMAGING | Facility: HOSPITAL | Age: 63
End: 2023-12-09
Payer: MEDICARE

## 2023-12-09 VITALS
WEIGHT: 190 LBS | HEIGHT: 68 IN | HEART RATE: 63 BPM | SYSTOLIC BLOOD PRESSURE: 156 MMHG | RESPIRATION RATE: 16 BRPM | TEMPERATURE: 97.8 F | BODY MASS INDEX: 28.79 KG/M2 | DIASTOLIC BLOOD PRESSURE: 82 MMHG | OXYGEN SATURATION: 98 %
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
## 2023-12-09 DIAGNOSIS — S00.83XA CONTUSION OF FACE, INITIAL ENCOUNTER: ICD-10-CM

## 2023-12-09 DIAGNOSIS — S02.2XXA CLOSED FRACTURE OF NASAL BONE, INITIAL ENCOUNTER: Primary | ICD-10-CM

## 2023-12-09 LAB
BACTERIA UR QL AUTO: ABNORMAL /HPF
BILIRUB UR QL STRIP: NEGATIVE
CLARITY UR: CLEAR
COLOR UR: YELLOW
GLUCOSE UR STRIP-MCNC: NEGATIVE MG/DL
HGB UR QL STRIP.AUTO: NEGATIVE
HYALINE CASTS UR QL AUTO: ABNORMAL /LPF
KETONES UR QL STRIP: NEGATIVE
LEUKOCYTE ESTERASE UR QL STRIP.AUTO: ABNORMAL
NITRITE UR QL STRIP: NEGATIVE
PH UR STRIP.AUTO: 5.5 [PH] (ref 5–8)
PROT UR QL STRIP: NEGATIVE
RBC # UR STRIP: ABNORMAL /HPF
REF LAB TEST METHOD: ABNORMAL
SP GR UR STRIP: 1.02 (ref 1–1.03)
SQUAMOUS #/AREA URNS HPF: ABNORMAL /HPF
UROBILINOGEN UR QL STRIP: ABNORMAL
WBC # UR STRIP: ABNORMAL /HPF

## 2023-12-09 PROCEDURE — 70486 CT MAXILLOFACIAL W/O DYE: CPT

## 2023-12-09 PROCEDURE — 99284 EMERGENCY DEPT VISIT MOD MDM: CPT

## 2023-12-09 PROCEDURE — 81001 URINALYSIS AUTO W/SCOPE: CPT | Performed by: PHYSICIAN ASSISTANT
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
## (undated) DEVICE — ANTIBACTERIAL UNDYED BRAIDED (POLYGLACTIN 910), SYNTHETIC ABSORBABLE SUTURE: Brand: COATED VICRYL

## (undated) DEVICE — PATIENT RETURN ELECTRODE, SINGLE-USE, CONTACT QUALITY MONITORING, ADULT, WITH 9FT CORD, FOR PATIENTS WEIGING OVER 33LBS. (15KG): Brand: MEGADYNE

## (undated) DEVICE — LEGGINGS, PAIR, 31X48, STERILE: Brand: MEDLINE

## (undated) DEVICE — GLV SURG SENSICARE PI MIC PF SZ6.5 LF STRL

## (undated) DEVICE — SYR LL TP 10ML STRL

## (undated) DEVICE — GOWN,SIRUS,NON REINFRCD,LARGE,SET IN SL: Brand: MEDLINE

## (undated) DEVICE — TBG PENCL TELESCP MEGADYNE SMOKE EVAC 10FT

## (undated) DEVICE — ELECTRD BLD EZ CLN MOD XLNG 2.75IN

## (undated) DEVICE — CVR TRANSD CIV FLX TPR 11.9 TO 3.8X61CM

## (undated) DEVICE — KT INK TISS MARGINMARKER STD 6COLOR

## (undated) DEVICE — PK UNIV COMPL 40

## (undated) DEVICE — RUBBERBAND LF STRL PK/2

## (undated) DEVICE — TRAP FLD MINIVAC MEGADYNE 100ML

## (undated) DEVICE — ADHS SKIN SURG TISS VISC PREMIERPRO EXOFIN HI/VISC FAST/DRY

## (undated) DEVICE — GAUZE,SPONGE,4"X4",16PLY,XRAY,STRL,LF: Brand: MEDLINE

## (undated) DEVICE — TOWEL,OR,DSP,ST,BLUE,STD,4/PK,20PK/CS: Brand: MEDLINE

## (undated) DEVICE — GLV SURG SENSICARE POLYISPRN W/ALOE PF LF 6.5 GRN STRL

## (undated) DEVICE — STCKNT IMPERV 9X36IN STRL

## (undated) DEVICE — NDL HYPO ECLPS SFTY 22G 1 1/2IN

## (undated) DEVICE — STPLR SKIN VISISTAT WD 35CT

## (undated) DEVICE — SUT MNCRYL PLS ANTIB UD 4/0 PS2 18IN

## (undated) DEVICE — BANDAGE,GAUZE,BULKEE II,4.5"X4.1YD,STRL: Brand: MEDLINE

## (undated) DEVICE — APPL CHLORAPREP HI/LITE 26ML ORNG